# Patient Record
Sex: FEMALE | Race: WHITE | NOT HISPANIC OR LATINO | Employment: OTHER | URBAN - METROPOLITAN AREA
[De-identification: names, ages, dates, MRNs, and addresses within clinical notes are randomized per-mention and may not be internally consistent; named-entity substitution may affect disease eponyms.]

---

## 2018-02-16 ENCOUNTER — HOSPITAL ENCOUNTER (EMERGENCY)
Facility: HOSPITAL | Age: 63
Discharge: HOME | End: 2018-02-16
Attending: EMERGENCY MEDICINE
Payer: MEDICAID

## 2018-02-16 VITALS
SYSTOLIC BLOOD PRESSURE: 177 MMHG | OXYGEN SATURATION: 95 % | RESPIRATION RATE: 20 BRPM | HEART RATE: 92 BPM | BODY MASS INDEX: 24.11 KG/M2 | TEMPERATURE: 99 F | WEIGHT: 150 LBS | DIASTOLIC BLOOD PRESSURE: 79 MMHG | HEIGHT: 66 IN

## 2018-02-16 DIAGNOSIS — K04.7 DENTAL ABSCESS: Primary | ICD-10-CM

## 2018-02-16 PROCEDURE — 41800 DRAINAGE OF GUM LESION: CPT

## 2018-02-16 PROCEDURE — 25000003 PHARM REV CODE 250: Performed by: PHYSICIAN ASSISTANT

## 2018-02-16 PROCEDURE — 99283 EMERGENCY DEPT VISIT LOW MDM: CPT | Mod: 25

## 2018-02-16 RX ORDER — LIDOCAINE HYDROCHLORIDE AND EPINEPHRINE 10; 10 MG/ML; UG/ML
10 INJECTION, SOLUTION INFILTRATION; PERINEURAL ONCE
Status: COMPLETED | OUTPATIENT
Start: 2018-02-16 | End: 2018-02-16

## 2018-02-16 RX ORDER — AMOXICILLIN 875 MG/1
875 TABLET, FILM COATED ORAL
COMMUNITY
End: 2020-10-29

## 2018-02-16 RX ORDER — CLINDAMYCIN HYDROCHLORIDE 150 MG/1
450 CAPSULE ORAL 3 TIMES DAILY
Qty: 63 CAPSULE | Refills: 0 | Status: SHIPPED | OUTPATIENT
Start: 2018-02-16 | End: 2018-02-23

## 2018-02-16 RX ORDER — TRAMADOL HYDROCHLORIDE 50 MG/1
50 TABLET ORAL EVERY 6 HOURS PRN
Qty: 5 TABLET | Refills: 0 | Status: SHIPPED | OUTPATIENT
Start: 2018-02-16

## 2018-02-16 RX ORDER — BUPIVACAINE HYDROCHLORIDE AND EPINEPHRINE 5; 5 MG/ML; UG/ML
3.6 INJECTION, SOLUTION EPIDURAL; INTRACAUDAL; PERINEURAL
Status: DISCONTINUED | OUTPATIENT
Start: 2018-02-16 | End: 2018-02-16 | Stop reason: RX

## 2018-02-16 RX ADMIN — LIDOCAINE HYDROCHLORIDE AND EPINEPHRINE 10 ML: 10; 10 INJECTION, SOLUTION INFILTRATION; PERINEURAL at 12:02

## 2018-02-16 NOTE — ED PROVIDER NOTES
Encounter Date: 2/16/2018    SCRIBE #1 NOTE: I, Pardeep Fowler, am scribing for, and in the presence of,  Quiana Barnes PA-C. I have scribed the following portions of the note - Other sections scribed: HPI and ROS.       History     Chief Complaint   Patient presents with    Dental Pain     Reports being treated for a sinus infection. Has been taking amoxicillin and ibuprofen. States ibuprofen is not working and now her gums are blood red and hurting     CC: Dental Pain     HPI: This 62 y.o F with COPD presents to the ED c/o acute onset of constant and worsening severe (10/10) R side dental pain with associated mild R side facial swelling and gum swelling since 2/11/18. The pt also reports R side jaw pain, right otalgia, right side sinus pressure and intermittent clear rhinorrhea. The pt also notes the right side of her face is warm to the tough. The pt also reports chills and diaphoresis at night x2 days. The pt notes she is only able to consume broth due to the pain. The pt was dx with a sinus infection at urgent care 2/12/18 and Rx amoxacillin and ibuprofen.       The history is provided by the patient. No  was used.     Review of patient's allergies indicates:   Allergen Reactions    Fish containing products     Tetracyclines      Past Medical History:   Diagnosis Date    COPD (chronic obstructive pulmonary disease)     H/O tubal ligation      Past Surgical History:   Procedure Laterality Date    ANKLE SURGERY      CHOLECYSTECTOMY      TUBAL LIGATION       No family history on file.  Social History   Substance Use Topics    Smoking status: Current Every Day Smoker     Packs/day: 0.50     Types: Cigarettes    Smokeless tobacco: Not on file    Alcohol use No     Review of Systems   Constitutional: Positive for chills and diaphoresis (at night). Negative for fatigue and fever.   HENT: Positive for dental problem (R side), ear pain (right ear), facial swelling (R side), rhinorrhea  "and sinus pressure (R side). Negative for sore throat.         (+) R side gum swelling  (-) dental drainage  (+) R side jaw pain   Eyes: Positive for visual disturbance ("cloudy"). Negative for redness.   Respiratory: Negative for cough and shortness of breath.    Cardiovascular: Negative for chest pain.   Gastrointestinal: Negative for abdominal pain, diarrhea, nausea and vomiting.   Genitourinary: Negative for dysuria, frequency and urgency.   Musculoskeletal: Negative for back pain and neck pain.   Skin: Negative for rash.   Neurological: Negative for syncope, weakness and headaches.   Psychiatric/Behavioral: Negative for confusion. The patient is not nervous/anxious.        Physical Exam     Initial Vitals [02/16/18 0941]   BP Pulse Resp Temp SpO2   (!) 146/67 100 20 97.5 °F (36.4 °C) 98 %      MAP       93.33         Physical Exam    Nursing note and vitals reviewed.  Constitutional: Vital signs are normal. She appears well-developed and well-nourished. She is not diaphoretic. She is cooperative.  Non-toxic appearance. She does not have a sickly appearance. She does not appear ill. No distress.   HENT:   Head: Normocephalic and atraumatic.   Right Ear: Tympanic membrane, external ear and ear canal normal.   Left Ear: Tympanic membrane, external ear and ear canal normal.   Nose: Nose normal.   Mouth/Throat: Uvula is midline and oropharynx is clear and moist. No trismus in the jaw. No uvula swelling. No oropharyngeal exudate, posterior oropharyngeal edema or posterior oropharyngeal erythema.       Eyes: Conjunctivae, EOM and lids are normal. Pupils are equal, round, and reactive to light.   Neck: Trachea normal, normal range of motion, full passive range of motion without pain and phonation normal. Neck supple.   Cardiovascular: Normal rate, regular rhythm, normal heart sounds and intact distal pulses. Exam reveals no gallop and no friction rub.    No murmur heard.  Pulmonary/Chest: Effort normal and breath " sounds normal. No respiratory distress. She has no decreased breath sounds. She has no wheezes. She has no rhonchi. She has no rales.   Abdominal: Soft. Normal appearance and bowel sounds are normal. She exhibits no distension and no mass. There is no tenderness. There is no rigidity, no rebound and no guarding.   Musculoskeletal: Normal range of motion.   Neurological: She is alert and oriented to person, place, and time. She has normal strength. No cranial nerve deficit or sensory deficit. GCS eye subscore is 4. GCS verbal subscore is 5. GCS motor subscore is 6.   Skin: Skin is warm and dry. Capillary refill takes less than 2 seconds. No rash noted.   Psychiatric: She has a normal mood and affect. Her speech is normal and behavior is normal. Judgment and thought content normal. Cognition and memory are normal.         ED Course   I & D - Incision and Drainage  Date/Time: 2/16/2018 2:49 PM  Performed by: OTILIA ARELLANO  Authorized by: SONG THOMAS   Consent Done: Yes  Consent: Verbal consent obtained.  Consent given by: patient  Patient understanding: patient states understanding of the procedure being performed  Patient consent: the patient's understanding of the procedure matches consent given  Patient identity confirmed: verbally with patient  Type: abscess  Body area: mouth  Location details: alveolar process  Anesthesia: local infiltration    Anesthesia:  Local Anesthetic: bupivacaine 0.5% without epinephrine and lidocaine 1% with epinephrine (mixed, 5 mL of each )  Anesthetic total: 3 mL  Scalpel size: 11  Incision type: single straight  Complexity: simple  Drainage: pus and  bloody  Drainage amount: moderate  Wound treatment: incision  Patient tolerance: Patient tolerated the procedure well with no immediate complications        Labs Reviewed - No data to display          Medical Decision Making:   Initial Assessment:   This is an evaluation of a 62 y.o. female that presents to the Emergency  Department for dental pain.  Patient reports right lower dental pain with associated red gum and swelling that began on Sunday and has progressively worsened.  She describes the pain as a pulsating 8 out of 10 pain she reports that she was recently treated with amoxicillin and ibuprofen for a sinus infection. She also admits to right sided ear pain and jaw pain associated with her dental pain. She denies any improvement in her dental swelling while taking this medication. She denies any vision changes, sinus pressure or pain, rhinorrhea, trouble swallowing, sore throat, chest pain, shortness of breath or any further symptoms.     Physical Exam shows a non-toxic, afebrile, and well appearing female.  Normocephalic and atraumatic.  PERRLA.  EOMI without pain.  There is no periocular or periorbital swelling. Nares patent, no polyps or rhinorrhea. No sinus TTP. There is an area of induration and fluctuance at the base of tooth #5 of the gingivae with tenderness to palpation and localized swelling that extends to the right cheek. No drainage. The tooth is broken and there is evidence of decay. Poor dentition and with multiple missing and decaying teeth. No further oral lesions noted. Uvula is midline. Posterior Oropharyngeal cavity clear.  No cervical adenopathy.  No nuchal rigidity.  Lungs are clear to auscultation bilaterally, no wheezing, rales or rhonchi.  Regular rate and rhythm, no murmurs, gallops or rubs.  The remainder of the physical exam is unremarkable.    Vital Signs Are Reassuring. If available, previous records reviewed.     My overall impression is Dental abscess. I considered, but at this time, do not suspect Cristiano's angina, facial abscess, sinusitus.     ED Course: I&D of ascess (see procedure note); patient tolerated procedure well with no immediate complications. D/C Meds: Clindamycin, Ultram. Additional D/C Information: Instructed patient to continue her amoxicillin.  Educated patient to follow-up  with her dentist as soon as possible.  Strict ED return precautions discussed. The diagnosis, treatment plan, instructions for follow-up and reevaluation with Dentist, as well as ED return precautions were discussed and understanding was verbalized. All questions or concerns have been addressed. Patient was discharged home with an instructional sheet which gave not only information regarding the most likely diagnoses but also information regarding when to return to the emergency department for alarming symptoms and when to seek further care.      This case was discussed with and the patient has been examined by Dr. Read who is in agreement with my assessment and plan.     Quiana Barnes PA-C              Scribe Attestation:   Scribe #1: I performed the above scribed service and the documentation accurately describes the services I performed. I attest to the accuracy of the note.    Attending Attestation:     Physician Attestation Statement for NP/PA:   I have conducted a face to face encounter with this patient in addition to the NP/PA, due to NP/PA Request    Other NP/PA Attestation Additions:     Physical Exam: Right lower jaw with fluctuant mass. Will need drainage.         Physician Attestation for Scribe:  Physician Attestation Statement for Scribe #1: I, Quiana Barnes PA-C, reviewed documentation, as scribed by Pardeep Fowler in my presence, and it is both accurate and complete.                    Clinical Impression:   The encounter diagnosis was Dental abscess.    Disposition:   Disposition: Discharged  Condition: Stable                        Quiana Barnes PA-C  02/16/18 1452

## 2018-02-16 NOTE — ED PROVIDER NOTES
Encounter Date: 2/16/2018       History     Chief Complaint   Patient presents with    Dental Pain     Reports being treated for a sinus infection. Has been taking amoxicillin and ibuprofen. States ibuprofen is not working and now her gums are blood red and hurting     HPI  Review of patient's allergies indicates:   Allergen Reactions    Fish containing products     Tetracyclines      Past Medical History:   Diagnosis Date    COPD (chronic obstructive pulmonary disease)     H/O tubal ligation      Past Surgical History:   Procedure Laterality Date    ANKLE SURGERY      CHOLECYSTECTOMY      TUBAL LIGATION       No family history on file.  Social History   Substance Use Topics    Smoking status: Current Every Day Smoker     Packs/day: 0.50     Types: Cigarettes    Smokeless tobacco: Not on file    Alcohol use No     Review of Systems    Physical Exam     Initial Vitals [02/16/18 0941]   BP Pulse Resp Temp SpO2   (!) 146/67 100 20 97.5 °F (36.4 °C) 98 %      MAP       93.33         Physical Exam    ED Course   Procedures  Labs Reviewed - No data to display                       Attending Attestation:     Physician Attestation Statement for NP/PA:   I have conducted a face to face encounter with this patient in addition to the NP/PA, due to    Other NP/PA Attestation Additions:      Medical Decision Making: I have reviewed the documentation and discussed the case with the midlevel. I helped formulate the plan.                       Clinical Impression:   The encounter diagnosis was Dental abscess.                           Rosendo Read MD  02/16/18 1519       Rosendo Read MD  02/16/18 1519       Rsoendo Read MD  02/16/18 9988

## 2018-02-16 NOTE — DISCHARGE INSTRUCTIONS
Please follow up with a dentist as soon as possible for reevaluation of symptoms, if you do not have one you can follow up with one of the dental clinics from the list that will given to you with your discharge instructions.    Please take all your antibiotics as prescribed even if your are feeling better or your symptoms resolve.    Please return to the ER for any worsening symptoms including: fever, facial swelling/redness, difficulty opening your mouth/breathing/swallowing or new symptoms or other concerns.

## 2018-02-16 NOTE — ED TRIAGE NOTES
Started amoxicillin on Monday for dental infection. Outer swelling has gone down, internal swelling almost covering tooth. Affecting jaw and ear.

## 2020-10-29 ENCOUNTER — HOSPITAL ENCOUNTER (EMERGENCY)
Facility: HOSPITAL | Age: 65
Discharge: HOME | End: 2020-10-29
Attending: EMERGENCY MEDICINE
Payer: MEDICAID

## 2020-10-29 VITALS
HEART RATE: 78 BPM | OXYGEN SATURATION: 98 % | HEIGHT: 66 IN | WEIGHT: 140 LBS | SYSTOLIC BLOOD PRESSURE: 108 MMHG | DIASTOLIC BLOOD PRESSURE: 55 MMHG | RESPIRATION RATE: 18 BRPM | TEMPERATURE: 98 F | BODY MASS INDEX: 22.5 KG/M2

## 2020-10-29 DIAGNOSIS — K52.9 ENTERITIS: Primary | ICD-10-CM

## 2020-10-29 DIAGNOSIS — R10.84 GENERALIZED ABDOMINAL PAIN: ICD-10-CM

## 2020-10-29 DIAGNOSIS — R07.9 CHEST PAIN: ICD-10-CM

## 2020-10-29 LAB
ALBUMIN SERPL BCP-MCNC: 4.1 G/DL (ref 3.5–5.2)
ALP SERPL-CCNC: 54 U/L (ref 55–135)
ALT SERPL W/O P-5'-P-CCNC: 28 U/L (ref 10–44)
ANION GAP SERPL CALC-SCNC: 12 MMOL/L (ref 8–16)
AST SERPL-CCNC: 29 U/L (ref 10–40)
BACTERIA #/AREA URNS HPF: ABNORMAL /HPF
BASOPHILS # BLD AUTO: 0.05 K/UL (ref 0–0.2)
BASOPHILS NFR BLD: 0.3 % (ref 0–1.9)
BILIRUB SERPL-MCNC: 0.5 MG/DL (ref 0.1–1)
BILIRUB UR QL STRIP: NEGATIVE
BUN SERPL-MCNC: 12 MG/DL (ref 8–23)
CALCIUM SERPL-MCNC: 9.1 MG/DL (ref 8.7–10.5)
CHLORIDE SERPL-SCNC: 99 MMOL/L (ref 95–110)
CLARITY UR: ABNORMAL
CO2 SERPL-SCNC: 26 MMOL/L (ref 23–29)
COLOR UR: YELLOW
CREAT SERPL-MCNC: 1.2 MG/DL (ref 0.5–1.4)
DIFFERENTIAL METHOD: ABNORMAL
EOSINOPHIL # BLD AUTO: 0.1 K/UL (ref 0–0.5)
EOSINOPHIL NFR BLD: 0.9 % (ref 0–8)
ERYTHROCYTE [DISTWIDTH] IN BLOOD BY AUTOMATED COUNT: 13.8 % (ref 11.5–14.5)
EST. GFR  (AFRICAN AMERICAN): 55 ML/MIN/1.73 M^2
EST. GFR  (NON AFRICAN AMERICAN): 48 ML/MIN/1.73 M^2
GLUCOSE SERPL-MCNC: 146 MG/DL (ref 70–110)
GLUCOSE UR QL STRIP: NEGATIVE
HCT VFR BLD AUTO: 42.9 % (ref 37–48.5)
HGB BLD-MCNC: 15 G/DL (ref 12–16)
HGB UR QL STRIP: ABNORMAL
HYALINE CASTS #/AREA URNS LPF: 0 /LPF
IMM GRANULOCYTES # BLD AUTO: 0.07 K/UL (ref 0–0.04)
IMM GRANULOCYTES NFR BLD AUTO: 0.5 % (ref 0–0.5)
KETONES UR QL STRIP: ABNORMAL
LEUKOCYTE ESTERASE UR QL STRIP: ABNORMAL
LIPASE SERPL-CCNC: 22 U/L (ref 4–60)
LYMPHOCYTES # BLD AUTO: 3 K/UL (ref 1–4.8)
LYMPHOCYTES NFR BLD: 20.1 % (ref 18–48)
MCH RBC QN AUTO: 31.4 PG (ref 27–31)
MCHC RBC AUTO-ENTMCNC: 35 G/DL (ref 32–36)
MCV RBC AUTO: 90 FL (ref 82–98)
MICROSCOPIC COMMENT: ABNORMAL
MONOCYTES # BLD AUTO: 1 K/UL (ref 0.3–1)
MONOCYTES NFR BLD: 6.4 % (ref 4–15)
NEUTROPHILS # BLD AUTO: 10.8 K/UL (ref 1.8–7.7)
NEUTROPHILS NFR BLD: 71.8 % (ref 38–73)
NITRITE UR QL STRIP: NEGATIVE
NRBC BLD-RTO: 0 /100 WBC
PH UR STRIP: 6 [PH] (ref 5–8)
PLATELET # BLD AUTO: 524 K/UL (ref 150–350)
PMV BLD AUTO: 9.5 FL (ref 9.2–12.9)
POTASSIUM SERPL-SCNC: 3.4 MMOL/L (ref 3.5–5.1)
PROT SERPL-MCNC: 8 G/DL (ref 6–8.4)
PROT UR QL STRIP: NEGATIVE
RBC # BLD AUTO: 4.78 M/UL (ref 4–5.4)
RBC #/AREA URNS HPF: 5 /HPF (ref 0–4)
SODIUM SERPL-SCNC: 137 MMOL/L (ref 136–145)
SP GR UR STRIP: >1.03 (ref 1–1.03)
SQUAMOUS #/AREA URNS HPF: 10 /HPF
TROPONIN I SERPL DL<=0.01 NG/ML-MCNC: <0.006 NG/ML (ref 0–0.03)
URN SPEC COLLECT METH UR: ABNORMAL
UROBILINOGEN UR STRIP-ACNC: NEGATIVE EU/DL
WBC # BLD AUTO: 15.01 K/UL (ref 3.9–12.7)
WBC #/AREA URNS HPF: 40 /HPF (ref 0–5)
YEAST URNS QL MICRO: ABNORMAL

## 2020-10-29 PROCEDURE — 87086 URINE CULTURE/COLONY COUNT: CPT

## 2020-10-29 PROCEDURE — 93010 ELECTROCARDIOGRAM REPORT: CPT | Mod: ,,, | Performed by: INTERNAL MEDICINE

## 2020-10-29 PROCEDURE — 80053 COMPREHEN METABOLIC PANEL: CPT

## 2020-10-29 PROCEDURE — 81000 URINALYSIS NONAUTO W/SCOPE: CPT

## 2020-10-29 PROCEDURE — 96365 THER/PROPH/DIAG IV INF INIT: CPT | Mod: 59

## 2020-10-29 PROCEDURE — 25500020 PHARM REV CODE 255: Performed by: EMERGENCY MEDICINE

## 2020-10-29 PROCEDURE — 99285 EMERGENCY DEPT VISIT HI MDM: CPT | Mod: 25

## 2020-10-29 PROCEDURE — 63600175 PHARM REV CODE 636 W HCPCS: Performed by: PHYSICIAN ASSISTANT

## 2020-10-29 PROCEDURE — 96375 TX/PRO/DX INJ NEW DRUG ADDON: CPT

## 2020-10-29 PROCEDURE — 93005 ELECTROCARDIOGRAM TRACING: CPT

## 2020-10-29 PROCEDURE — 96361 HYDRATE IV INFUSION ADD-ON: CPT

## 2020-10-29 PROCEDURE — 25000003 PHARM REV CODE 250: Performed by: PHYSICIAN ASSISTANT

## 2020-10-29 PROCEDURE — 83690 ASSAY OF LIPASE: CPT

## 2020-10-29 PROCEDURE — 85025 COMPLETE CBC W/AUTO DIFF WBC: CPT

## 2020-10-29 PROCEDURE — 93010 EKG 12-LEAD: ICD-10-PCS | Mod: ,,, | Performed by: INTERNAL MEDICINE

## 2020-10-29 PROCEDURE — 84484 ASSAY OF TROPONIN QUANT: CPT

## 2020-10-29 RX ORDER — POLYETHYLENE GLYCOL 3350 17 G/17G
17 POWDER, FOR SOLUTION ORAL DAILY
Refills: 0 | COMMUNITY
Start: 2020-10-29

## 2020-10-29 RX ORDER — MORPHINE SULFATE 4 MG/ML
4 INJECTION, SOLUTION INTRAMUSCULAR; INTRAVENOUS
Status: COMPLETED | OUTPATIENT
Start: 2020-10-29 | End: 2020-10-29

## 2020-10-29 RX ORDER — PANTOPRAZOLE SODIUM 40 MG/1
40 TABLET, DELAYED RELEASE ORAL DAILY
Qty: 30 TABLET | Refills: 0 | Status: SHIPPED | OUTPATIENT
Start: 2020-10-29 | End: 2021-10-29

## 2020-10-29 RX ORDER — CEPHALEXIN 500 MG/1
500 CAPSULE ORAL EVERY 12 HOURS
Qty: 20 CAPSULE | Refills: 0 | Status: SHIPPED | OUTPATIENT
Start: 2020-10-29 | End: 2020-11-08

## 2020-10-29 RX ORDER — DEXTROMETHORPHAN HYDROBROMIDE, GUAIFENESIN 5; 100 MG/5ML; MG/5ML
650 LIQUID ORAL EVERY 8 HOURS PRN
Qty: 30 TABLET | Refills: 0 | OUTPATIENT
Start: 2020-10-29 | End: 2022-08-09

## 2020-10-29 RX ORDER — ONDANSETRON 2 MG/ML
4 INJECTION INTRAMUSCULAR; INTRAVENOUS
Status: COMPLETED | OUTPATIENT
Start: 2020-10-29 | End: 2020-10-29

## 2020-10-29 RX ORDER — ONDANSETRON 4 MG/1
4 TABLET, ORALLY DISINTEGRATING ORAL EVERY 12 HOURS PRN
Qty: 10 TABLET | Refills: 0 | Status: SHIPPED | OUTPATIENT
Start: 2020-10-29

## 2020-10-29 RX ADMIN — MORPHINE SULFATE 4 MG: 4 INJECTION INTRAVENOUS at 06:10

## 2020-10-29 RX ADMIN — SODIUM CHLORIDE 1000 ML: 0.9 INJECTION, SOLUTION INTRAVENOUS at 06:10

## 2020-10-29 RX ADMIN — PROMETHAZINE HYDROCHLORIDE 25 MG: 25 INJECTION INTRAMUSCULAR; INTRAVENOUS at 08:10

## 2020-10-29 RX ADMIN — IOHEXOL 75 ML: 350 INJECTION, SOLUTION INTRAVENOUS at 06:10

## 2020-10-29 RX ADMIN — ONDANSETRON 4 MG: 2 INJECTION INTRAMUSCULAR; INTRAVENOUS at 06:10

## 2020-10-29 NOTE — ED PROVIDER NOTES
"Encounter Date: 10/29/2020    SCRIBE #1 NOTE: I, Yohana Jovany, am scribing for, and in the presence of,  Adeel Rose PA-C. I have scribed the following portions of the note - Other sections scribed: HPI, ROS.       History     Chief Complaint   Patient presents with    Abdominal Pain     C/o abdominal pain and nausea x 2 hours, hx pancreatitis, also c/o sob      CC: Abdominal Pain    HPI:  This is a 64 y.o. female with a history of pancreatitis who presents to the Ed with a chief complaint of generalized abdominal pain that began approximately five hours ago (1 pm). The patient states that the pain began in the epigastric region and she "thought it was heartburn." She states that the pain then spread to the rest of her abdomen. She reports associated symptoms of nausea and vomiting. Denies hematemesis or fever. No modifying factors were noted. She states that the pain feels similar to her previous occurrence of pancreatitis. She has a past surgical history of a cholecystectomy. Denies recent alcohol intake.    The history is provided by the patient.     Review of patient's allergies indicates:   Allergen Reactions    Fish containing products     Tetracyclines      Past Medical History:   Diagnosis Date    COPD (chronic obstructive pulmonary disease)     H/O tubal ligation      Past Surgical History:   Procedure Laterality Date    ANKLE SURGERY      CHOLECYSTECTOMY      TUBAL LIGATION       History reviewed. No pertinent family history.  Social History     Tobacco Use    Smoking status: Current Every Day Smoker     Packs/day: 0.50     Types: Cigarettes   Substance Use Topics    Alcohol use: No    Drug use: No     Review of Systems   Constitutional: Negative for activity change, appetite change, chills, diaphoresis and fever.   HENT: Negative for congestion, drooling, ear pain, mouth sores, rhinorrhea, sinus pain, sore throat and trouble swallowing.    Eyes: Negative for pain and discharge.   Respiratory: " Negative for cough, chest tightness, shortness of breath, wheezing and stridor.    Cardiovascular: Negative for chest pain, palpitations and leg swelling.   Gastrointestinal: Positive for abdominal pain (generalized), nausea and vomiting. Negative for abdominal distention, blood in stool, constipation and diarrhea.   Genitourinary: Negative for difficulty urinating, dysuria, flank pain, frequency, hematuria and urgency.   Musculoskeletal: Negative for arthralgias, back pain and myalgias.   Skin: Negative for pallor, rash and wound.   Neurological: Negative for dizziness, syncope, weakness, light-headedness and numbness.       Physical Exam     Initial Vitals [10/29/20 1532]   BP Pulse Resp Temp SpO2   (!) 139/55 70 18 97.5 °F (36.4 °C) 98 %      MAP       --         Physical Exam    ED Course   Procedures  Labs Reviewed   CBC W/ AUTO DIFFERENTIAL - Abnormal; Notable for the following components:       Result Value    WBC 15.01 (*)     MCH 31.4 (*)     Platelets 524 (*)     Gran # (ANC) 10.8 (*)     Immature Grans (Abs) 0.07 (*)     All other components within normal limits   COMPREHENSIVE METABOLIC PANEL - Abnormal; Notable for the following components:    Potassium 3.4 (*)     Glucose 146 (*)     Alkaline Phosphatase 54 (*)     eGFR if  55 (*)     eGFR if non  48 (*)     All other components within normal limits   URINALYSIS, REFLEX TO URINE CULTURE - Abnormal; Notable for the following components:    Appearance, UA Cloudy (*)     Specific Gravity, UA >1.030 (*)     Ketones, UA Trace (*)     Occult Blood UA 1+ (*)     Leukocytes, UA 3+ (*)     All other components within normal limits    Narrative:     Specimen Source->Urine   URINALYSIS MICROSCOPIC - Abnormal; Notable for the following components:    RBC, UA 5 (*)     WBC, UA 40 (*)     Bacteria Moderate (*)     All other components within normal limits    Narrative:     Specimen Source->Urine   CULTURE, URINE   LIPASE   TROPONIN I           Imaging Results          CT Abdomen Pelvis With Contrast (Final result)  Result time 10/29/20 19:23:26    Final result by Eileen Stanton MD (10/29/20 19:23:26)                 Impression:      Fluid-filled loops of prominent small bowel.  Some small bowel wall thickening.  Mesenteric edema and small free pelvic fluid.  Findings are concerning for enteritis.  Another consideration may include a developing small bowel obstruction.    Calcifications involving both adrenal glands.  Question remote hemorrhages.    Colonic diverticulosis without definite evidence of diverticulitis.      Electronically signed by: Eileen Stanton  Date:    10/29/2020  Time:    19:23             Narrative:    EXAMINATION:  CT OF ABDOMEN PELVIS WITH    CLINICAL HISTORY:  History of pancreatitis complaining of abdominal pain and nausea x2 hours.  Also complaining of shortness of breath.    TECHNIQUE:  5 mm enhanced axial images were obtained from the lung bases through the greater trochanters.  Seventy-five mL of Omnipaque 350 was injected.    COMPARISON:  06/18/2015    FINDINGS:  There is some fluid filled nondilated but prominent loops of small bowel.  Mesenteric edema and small free pelvic fluid are noted.  A few of the small bowel loops contain a demonstrate a thickening.  There is even focal narrowing of a short segment of the small bowel in the central abdomen (series 2 axial image 75 coronal image 33 and sagittal image 82).    The liver, spleen, pancreas, and kidneys are unremarkable. The gallbladder is surgically absent.  There are calcifications involving bilateral adrenal glands.    There is no definite evidence for abdominal adenopathy or ascites.  Advanced vascular calcifications are present.    The appendix is not inflamed.  There is colonic diverticulosis.  Moderate fecal material is present.  There is a lobular peripherally calcified 13 x 10 mm lesion in the left anterior pelvis, which previously was an area of fat  necrosis (series 2 axial image 103).    Minimal left basilar atelectasis is present.                               X-Ray Chest AP Portable (Final result)  Result time 10/29/20 17:30:54    Final result by Zafar Vasquez MD (10/29/20 17:30:54)                 Impression:      1. No acute cardiopulmonary process noting pulmonary hyperexpansion may reflect that of COPD/emphysema, correlation is advised.      Electronically signed by: Zafar Vasquez MD  Date:    10/29/2020  Time:    17:30             Narrative:    EXAMINATION:  XR CHEST AP PORTABLE    CLINICAL HISTORY:  Chest pain, unspecified    TECHNIQUE:  Single frontal view of the chest was performed.    COMPARISON:  06/10/2015    FINDINGS:  The cardiomediastinal silhouette is not enlarged noting calcification of the aorta..  There is no pleural effusion.  The trachea is midline.  The lungs are symmetrically expanded bilaterally without evidence of acute parenchymal process.  There are scattered calcified granulomas.  No large focal consolidation seen.  There is no pneumothorax.  The osseous structures are remarkable for degenerative change..                                 Medical Decision Making:   ED Management:  63 y/o female with abdominal pain, N/V today. Last BM yesterday. No fever. Abdomen with mild generalized tenderness. CT with evidence for enteritis, not able to r/o early obstruction. Pt passing gas today. No evidence of appendicitis, diverticulitis, or abscess. Labs notable for leukocytosis. Symptoms much improved after meds given and she is tolerating po. Lower suspicion for SBO, but pt given strict return precautions. Discharged with PCP and GI f/u instructions.   Adeel SCOTT                After shift change, I was informed by Patricio Fish RN that lab called her stating there was an error with the UA results initially uploaded. Pt UA with 10 squams, moderate bacteria, 40 WBCs and 5 RBCs. Due to abdominal pain with nausea and vomting will  cover with keflex for possible UTI. Discussed with pt.   NATALI Kenney PA-C 10/29/2020 2131             Scribe Attestation:   Scribe #1: I performed the above scribed service and the documentation accurately describes the services I performed. I attest to the accuracy of the note.                      Clinical Impression:       ICD-10-CM ICD-9-CM   1. Enteritis  K52.9 558.9   2. Chest pain  R07.9 786.50   3. Generalized abdominal pain  R10.84 789.07                          ED Disposition Condition    Discharge Stable        ED Prescriptions     Medication Sig Dispense Start Date End Date Auth. Provider    ondansetron (ZOFRAN-ODT) 4 MG TbDL Take 1 tablet (4 mg total) by mouth every 12 (twelve) hours as needed (vomiting). 10 tablet 10/29/2020  Adeel Rose PA-C    pantoprazole (PROTONIX) 40 MG tablet Take 1 tablet (40 mg total) by mouth once daily. 30 tablet 10/29/2020 10/29/2021 Adeel Rose PA-C    acetaminophen (TYLENOL) 650 MG TbSR Take 1 tablet (650 mg total) by mouth every 8 (eight) hours as needed. 30 tablet 10/29/2020  Adeel Rose PA-C    polyethylene glycol (GLYCOLAX) 17 gram/dose powder Take 17 g by mouth once daily.  10/29/2020  Adeel Rose PA-C    cephALEXin (KEFLEX) 500 MG capsule Take 1 capsule (500 mg total) by mouth every 12 (twelve) hours. for 10 days 20 capsule 10/29/2020 11/8/2020 Angi Kenney PA-C        Follow-up Information     Follow up With Specialties Details Why Contact Info Additional Information    Primary care provider  Schedule an appointment as soon as possible for a visit        [facility] [address] [facility] Schedule an appointment as soon as possible for a visit  For follow-up care [id number]  [facility] [address] [phone] [facility] - Main Building, 4th Floor Please park in [facility] and take [facility] elevator    [facility] Go to  If symptoms worsen [address]  [place] [id number] [phone]                       I, Adeel Rose, personally performed the services described in this documentation. All medical record entries made by the scribe were at my direction and in my presence. I have reviewed the chart and agree that the record reflects my personal performance and is accurate and complete.                 Adeel Rose, PAGemmaC  10/29/20 3106

## 2020-10-29 NOTE — FIRST PROVIDER EVALUATION
Emergency Department TeleTriage Encounter Note      CHIEF COMPLAINT    Chief Complaint   Patient presents with    Abdominal Pain     C/o abdominal pain and nausea x 2 hours, hx pancreatitis, also c/o sob        VITAL SIGNS   Initial Vitals [10/29/20 1532]   BP Pulse Resp Temp SpO2   (!) 139/55 70 18 97.5 °F (36.4 °C) 98 %      MAP       --            ALLERGIES    Review of patient's allergies indicates:   Allergen Reactions    Fish containing products     Tetracyclines        PROVIDER TRIAGE NOTE  This is a teletriage evaluation of a 64 y.o. female presenting to the ED with c/o chest pain that radiates down to her abdomen. N/V. Hx of pancreatitis. Feels similar. Initial orders will be placed and care will be transferred to an alternate provider when patient is roomed for a full evaluation. Any additional orders and the final disposition will be determined by that provider.         ORDERS  Labs Reviewed   CBC W/ AUTO DIFFERENTIAL   COMPREHENSIVE METABOLIC PANEL   LIPASE   URINALYSIS, REFLEX TO URINE CULTURE   TROPONIN I       ED Orders (720h ago, onward)    Start Ordered     Status Ordering Provider    10/29/20 1647 10/29/20 1646  EKG 12-lead  Once      Ordered WILLY JURADO    10/29/20 1647 10/29/20 1646  Troponin I  STAT  Collect    Ordered WILLY JURADO    10/29/20 1647 10/29/20 1646  X-Ray Chest AP Portable  1 time imaging      Ordered WILLY JURADO    10/29/20 1602 10/29/20 1601  Vital signs  Every 2 hours      Ordered JERI GRIGGS    10/29/20 1602 10/29/20 1601  Diet NPO  Diet effective now      Ordered JERI GRIGGS    10/29/20 1602 10/29/20 1601  Insert peripheral IV  Once      Ordered JERI GRIGGS    10/29/20 1602 10/29/20 1601  CBC W/ AUTO DIFFERENTIAL  STAT  Collect    Ordered JERI GRIGGS    10/29/20 1602 10/29/20 1601  Comp. Metabolic Panel  STAT  Collect    Ordered JERI GRIGGS    10/29/20 1602 10/29/20 1601  Lipase  STAT  Collect    Ordered JERI GRIGGS    10/29/20 1602 10/29/20  1602  Urinalysis, Reflex to Urine Culture Urine, Clean Catch  STAT      Ordered JERI GRIGGS            Virtual Visit Note: The provider triage portion of this emergency department evaluation and documentation was performed via [employer], a HIPAA-compliant telemedicine application, in concert with a tele-presenter in the room. A face to face patient evaluation with one of my colleagues will occur once the patient is placed in an emergency department room.      DISCLAIMER: This note was prepared with [employer] voice recognition transcription software. Garbled syntax, mangled pronouns, and other bizarre constructions may be attributed to that software system.

## 2020-10-29 NOTE — ED TRIAGE NOTES
abd pain states has pancreatitis.  + nausea, diarrhea and vomiting.  Denies fever.   Generalized pain from lower chest to lower abd.

## 2020-10-30 NOTE — ED NOTES
Kimmie Chavez with called stating that resulted UA results weren't for the pt and were uploaded in error. Kimmie states that correct results will be uploaded within 10 mins.

## 2020-10-31 LAB — BACTERIA UR CULT: NORMAL

## 2020-11-05 VITALS
OXYGEN SATURATION: 94 % | DIASTOLIC BLOOD PRESSURE: 86 MMHG | BODY MASS INDEX: 22.5 KG/M2 | HEART RATE: 109 BPM | SYSTOLIC BLOOD PRESSURE: 147 MMHG | HEIGHT: 66 IN | WEIGHT: 140 LBS | RESPIRATION RATE: 24 BRPM | TEMPERATURE: 99 F

## 2020-11-05 LAB
ALBUMIN SERPL BCP-MCNC: 3.8 G/DL (ref 3.5–5.2)
ALP SERPL-CCNC: 50 U/L (ref 55–135)
ALT SERPL W/O P-5'-P-CCNC: 17 U/L (ref 10–44)
ANION GAP SERPL CALC-SCNC: 14 MMOL/L (ref 8–16)
AST SERPL-CCNC: 20 U/L (ref 10–40)
BASOPHILS # BLD AUTO: 0.03 K/UL (ref 0–0.2)
BASOPHILS NFR BLD: 0.3 % (ref 0–1.9)
BILIRUB SERPL-MCNC: 0.5 MG/DL (ref 0.1–1)
BNP SERPL-MCNC: 19 PG/ML (ref 0–99)
BUN SERPL-MCNC: 14 MG/DL (ref 8–23)
CALCIUM SERPL-MCNC: 9.9 MG/DL (ref 8.7–10.5)
CHLORIDE SERPL-SCNC: 101 MMOL/L (ref 95–110)
CO2 SERPL-SCNC: 22 MMOL/L (ref 23–29)
CREAT SERPL-MCNC: 0.9 MG/DL (ref 0.5–1.4)
CTP QC/QA: YES
DIFFERENTIAL METHOD: ABNORMAL
EOSINOPHIL # BLD AUTO: 0.1 K/UL (ref 0–0.5)
EOSINOPHIL NFR BLD: 0.6 % (ref 0–8)
ERYTHROCYTE [DISTWIDTH] IN BLOOD BY AUTOMATED COUNT: 13.4 % (ref 11.5–14.5)
EST. GFR  (AFRICAN AMERICAN): >60 ML/MIN/1.73 M^2
EST. GFR  (NON AFRICAN AMERICAN): >60 ML/MIN/1.73 M^2
GLUCOSE SERPL-MCNC: 118 MG/DL (ref 70–110)
HCT VFR BLD AUTO: 41.1 % (ref 37–48.5)
HGB BLD-MCNC: 14.2 G/DL (ref 12–16)
IMM GRANULOCYTES # BLD AUTO: 0.06 K/UL (ref 0–0.04)
IMM GRANULOCYTES NFR BLD AUTO: 0.6 % (ref 0–0.5)
LYMPHOCYTES # BLD AUTO: 2.5 K/UL (ref 1–4.8)
LYMPHOCYTES NFR BLD: 24.7 % (ref 18–48)
MAGNESIUM SERPL-MCNC: 1.7 MG/DL (ref 1.6–2.6)
MCH RBC QN AUTO: 31.2 PG (ref 27–31)
MCHC RBC AUTO-ENTMCNC: 34.5 G/DL (ref 32–36)
MCV RBC AUTO: 90 FL (ref 82–98)
MONOCYTES # BLD AUTO: 1.4 K/UL (ref 0.3–1)
MONOCYTES NFR BLD: 14.1 % (ref 4–15)
NEUTROPHILS # BLD AUTO: 6 K/UL (ref 1.8–7.7)
NEUTROPHILS NFR BLD: 59.7 % (ref 38–73)
NRBC BLD-RTO: 0 /100 WBC
PLATELET # BLD AUTO: 478 K/UL (ref 150–350)
PMV BLD AUTO: 9.8 FL (ref 9.2–12.9)
POTASSIUM SERPL-SCNC: 3.7 MMOL/L (ref 3.5–5.1)
PROT SERPL-MCNC: 7.4 G/DL (ref 6–8.4)
RBC # BLD AUTO: 4.55 M/UL (ref 4–5.4)
SARS-COV-2 RDRP RESP QL NAA+PROBE: NEGATIVE
SODIUM SERPL-SCNC: 137 MMOL/L (ref 136–145)
TROPONIN I SERPL DL<=0.01 NG/ML-MCNC: 0.01 NG/ML (ref 0–0.03)
WBC # BLD AUTO: 9.99 K/UL (ref 3.9–12.7)

## 2020-11-05 PROCEDURE — U0002 COVID-19 LAB TEST NON-CDC: HCPCS | Performed by: PHYSICIAN ASSISTANT

## 2020-11-05 PROCEDURE — 83735 ASSAY OF MAGNESIUM: CPT

## 2020-11-05 PROCEDURE — 99285 EMERGENCY DEPT VISIT HI MDM: CPT | Mod: 25

## 2020-11-05 PROCEDURE — 83880 ASSAY OF NATRIURETIC PEPTIDE: CPT

## 2020-11-05 PROCEDURE — 93010 ELECTROCARDIOGRAM REPORT: CPT | Mod: ,,, | Performed by: INTERNAL MEDICINE

## 2020-11-05 PROCEDURE — 80053 COMPREHEN METABOLIC PANEL: CPT

## 2020-11-05 PROCEDURE — 93010 EKG 12-LEAD: ICD-10-PCS | Mod: ,,, | Performed by: INTERNAL MEDICINE

## 2020-11-05 PROCEDURE — 85025 COMPLETE CBC W/AUTO DIFF WBC: CPT

## 2020-11-05 PROCEDURE — 93005 ELECTROCARDIOGRAM TRACING: CPT

## 2020-11-05 PROCEDURE — 84484 ASSAY OF TROPONIN QUANT: CPT

## 2020-11-05 NOTE — FIRST PROVIDER EVALUATION
Emergency Department TeleTriage Encounter Note      CHIEF COMPLAINT    Chief Complaint   Patient presents with    Shortness of Breath     Pt c/o SOB x1 week. Reports hx of COPD. States she was seen in ED last week for same thing. Also c/o abdominal pain, reports she was told she has a blockage. Pain is 10/10    Abdominal Pain       VITAL SIGNS   Initial Vitals [11/05/20 1728]   BP Pulse Resp Temp SpO2   (!) 147/86 109 (!) 24 99.2 °F (37.3 °C) (!) 94 %      MAP       --            ALLERGIES    Review of patient's allergies indicates:   Allergen Reactions    Fish containing products     Tetracyclines        PROVIDER TRIAGE NOTE    64-year-old female presents to the ED for evaluation of shortness of breath worsening over the last week.  History of COPD.  No fevers or chills.  Has had a minimal cough.  Also complains of abdominal discomfort.  No vomiting or diarrhea      Initial orders will be placed and care will be transferred to an alternate provider when patient is roomed for a full evaluation. Any additional orders and the final disposition will be determined by that provider.      ORDERS  Labs Reviewed   CBC W/ AUTO DIFFERENTIAL   COMPREHENSIVE METABOLIC PANEL   MAGNESIUM   TROPONIN I   B-TYPE NATRIURETIC PEPTIDE   SARS-COV-2 RDRP GENE       ED Orders (720h ago, onward)    Start Ordered     Status Ordering Provider    11/05/20 1737 11/05/20 1736  Magnesium  STAT  Collect    Ordered JOSE NORMAN    11/05/20 1737 11/05/20 1736  Troponin I  STAT  Collect    Ordered JOSE NORMAN    11/05/20 1737 11/05/20 1736  Insert Saline lock IV  Once      Ordered JOSE NORMAN    11/05/20 1737 11/05/20 1736  Cardiac Monitoring - Adult  Continuous     Comments: Notify Physician If:    Ordered JOSE NORMAN    11/05/20 1737 11/05/20 1736  Pulse Oximetry Continuous  Continuous      Ordered JOSE NORMAN    11/05/20 1737 11/05/20 1736  EKG 12-lead  Once      Ordered JOSE NORMAN    11/05/20 1737  11/05/20 1736  Brain natriuretic peptide  STAT  Collect    Ordered EMERSON, JOSE    11/05/20 1737 11/05/20 1736  X-Ray Chest AP Portable  1 time imaging      Ordered EMERSON, JOSE    11/05/20 1737 11/05/20 1736  POCT COVID-19 Rapid Screening  Once      Ordered EMERSON, JOSE    11/05/20 1737 11/05/20 1736  Airborne and Contact and Droplet Isolation Status  Continuous      Ordered NAYELIUTTY, JOSE    11/05/20 1736 11/05/20 1736  CBC auto differential  STAT  Collect    Ordered EMERSON, JOSE    11/05/20 1736 11/05/20 1736  Comprehensive metabolic panel  STAT  Collect    Ordered EMERSON, JOSE            Virtual Visit Note: The provider triage portion of this emergency department evaluation and documentation was performed via [employer], a HIPAA-compliant telemedicine application, in concert with a tele-presenter in the room. A face to face patient evaluation with one of my colleagues will occur once the patient is placed in an emergency department room.      DISCLAIMER: This note was prepared with [employer] voice recognition transcription software. Garbled syntax, mangled pronouns, and other bizarre constructions may be attributed to that software system.

## 2020-11-06 ENCOUNTER — HOSPITAL ENCOUNTER (EMERGENCY)
Facility: HOSPITAL | Age: 65
Discharge: LEFT BEFORE DISCHARGE | End: 2020-11-06
Payer: MEDICAID

## 2020-11-06 DIAGNOSIS — R06.02 SHORTNESS OF BREATH: ICD-10-CM

## 2022-02-08 DIAGNOSIS — Z12.31 ENCOUNTER FOR SCREENING MAMMOGRAM FOR MALIGNANT NEOPLASM OF BREAST: Primary | ICD-10-CM

## 2022-02-08 DIAGNOSIS — M81.0 AGE-RELATED OSTEOPOROSIS WITHOUT CURRENT PATHOLOGICAL FRACTURE: Primary | ICD-10-CM

## 2022-08-09 ENCOUNTER — HOSPITAL ENCOUNTER (EMERGENCY)
Facility: HOSPITAL | Age: 67
Discharge: HOME | End: 2022-08-09
Attending: EMERGENCY MEDICINE
Payer: COMMERCIAL

## 2022-08-09 VITALS
SYSTOLIC BLOOD PRESSURE: 133 MMHG | HEART RATE: 89 BPM | BODY MASS INDEX: 24.91 KG/M2 | OXYGEN SATURATION: 98 % | WEIGHT: 155 LBS | RESPIRATION RATE: 20 BRPM | DIASTOLIC BLOOD PRESSURE: 68 MMHG | HEIGHT: 66 IN | TEMPERATURE: 98 F

## 2022-08-09 DIAGNOSIS — K08.89 PAIN, DENTAL: Primary | ICD-10-CM

## 2022-08-09 PROCEDURE — 25000003 PHARM REV CODE 250: Performed by: PHYSICIAN ASSISTANT

## 2022-08-09 PROCEDURE — 99284 EMERGENCY DEPT VISIT MOD MDM: CPT

## 2022-08-09 RX ORDER — ACETAMINOPHEN 500 MG
500 TABLET ORAL EVERY 4 HOURS PRN
Qty: 20 TABLET | Refills: 0 | Status: SHIPPED | OUTPATIENT
Start: 2022-08-09 | End: 2022-08-14

## 2022-08-09 RX ORDER — HYDROXYZINE PAMOATE 50 MG/1
50 CAPSULE ORAL 4 TIMES DAILY PRN
Qty: 30 CAPSULE | Refills: 0 | Status: SHIPPED | OUTPATIENT
Start: 2022-08-09

## 2022-08-09 RX ORDER — HYDROXYZINE PAMOATE 25 MG/1
50 CAPSULE ORAL
Status: COMPLETED | OUTPATIENT
Start: 2022-08-09 | End: 2022-08-09

## 2022-08-09 RX ORDER — OXYCODONE HYDROCHLORIDE 5 MG/1
5 TABLET ORAL EVERY 4 HOURS PRN
Qty: 15 TABLET | Refills: 0 | Status: SHIPPED | OUTPATIENT
Start: 2022-08-09 | End: 2022-08-12

## 2022-08-09 RX ORDER — OXYCODONE AND ACETAMINOPHEN 5; 325 MG/1; MG/1
1 TABLET ORAL
Status: COMPLETED | OUTPATIENT
Start: 2022-08-09 | End: 2022-08-09

## 2022-08-09 RX ADMIN — OXYCODONE AND ACETAMINOPHEN 1 TABLET: 5; 325 TABLET ORAL at 10:08

## 2022-08-09 RX ADMIN — HYDROXYZINE PAMOATE 50 MG: 25 CAPSULE ORAL at 10:08

## 2022-08-09 NOTE — ED PROVIDER NOTES
Encounter Date: 8/9/2022    SCRIBE #1 NOTE: I, Prosper Kelly, am scribing for, and in the presence of,  Angi Kenney PA-C. I have scribed the following portions of the note - Other sections scribed: HPI, ROS.       History     Chief Complaint   Patient presents with    Dental Pain     Pt reports left sided dental pain, reports she has a cracked tooth. Reports previous cracked tooth on left side. Reports her PCP prescribed amoxicillin and ibuprofen. Pt reports throbbing pain that started last night and numbness and swelling to cheek. Denies weakness and blurry vision.      CC: Dental pain    HPI: This is a 66 y.o. F who has COPD who presents to the ED for emergent evaluation of acute lower dental pain that began 3 days ago. She describes the dental pain as a throbbing pain. Pt has associated tingling to the left side of the face that began upon waking at 3:00am today. She states that she was unable to go back to sleep due to the pain, and tingling. She reports that she wakes up every 2 hours and has not been able to get a good nights rest since the onset of the dental pain. Pt states that she would like to sleep. She has only eaten 2 cups of mac and cheese, and 1 cup of ice cream within the last 3 days due to the dental pain. She attributes her symptoms to a cracked tooth. Pt states that she wears dentures, but she has not been wearing her dentures due to dentures applying pressure to the cracked tooth and causing pain. She called [facility] yesterday, and was prescribed Amoxicillin and Ibuprofen yesterday. She took 2 tablets of Amoxicillin today, and 1 tablet of Amoxicillin at 3:00am today. She states that she is going to call and schedule a dental appointment after today's ED visit. Pt denies SOB, CP, SI, or HI.    The history is provided by the patient. No  was used.     Review of patient's allergies indicates:   Allergen Reactions    Fish containing products     Tetracyclines      Past  Medical History:   Diagnosis Date    COPD (chronic obstructive pulmonary disease)     H/O tubal ligation      Past Surgical History:   Procedure Laterality Date    ANKLE SURGERY      CHOLECYSTECTOMY      TUBAL LIGATION       History reviewed. No pertinent family history.  Social History     Tobacco Use    Smoking status: Current Every Day Smoker     Packs/day: 0.50     Types: Cigarettes    Smokeless tobacco: Never Used   Substance Use Topics    Alcohol use: No    Drug use: No     Review of Systems   Constitutional: Negative for chills, diaphoresis and fever.   HENT: Positive for dental problem (lower dental pain).    Eyes: Negative for photophobia and visual disturbance.   Respiratory: Negative for cough and shortness of breath.    Cardiovascular: Negative for chest pain and leg swelling.   Gastrointestinal: Negative for abdominal pain, blood in stool, constipation, diarrhea, nausea and vomiting.   Genitourinary: Negative for dysuria, flank pain, frequency, hematuria and urgency.   Musculoskeletal: Negative for neck pain and neck stiffness.   Skin: Negative for rash and wound.   Neurological: Negative for weakness, light-headedness, numbness and headaches.        (+) Tingling to the left side of the face   Psychiatric/Behavioral: Positive for sleep disturbance (due to the dental pain). Negative for confusion and suicidal ideas.        (-) Homicidal ideations       Physical Exam     Initial Vitals [08/09/22 0847]   BP Pulse Resp Temp SpO2   124/63 88 15 98 °F (36.7 °C) 97 %      MAP       --         Physical Exam    Nursing note and vitals reviewed.  Constitutional: She appears well-developed and well-nourished.   HENT:   Head: Normocephalic.   Right Ear: External ear normal.   Left Ear: External ear normal.   Nose: Nose normal.   Mouth/Throat: Uvula is midline, oropharynx is clear and moist and mucous membranes are normal. No oropharyngeal exudate, posterior oropharyngeal edema or posterior oropharyngeal  erythema.       No protrusion of tongue or ttp of mouth floor   Tolerating secretions      Eyes: Conjunctivae are normal.   Cardiovascular: Normal rate and regular rhythm. Exam reveals no gallop and no friction rub.    No murmur heard.  Pulmonary/Chest: Breath sounds normal. No respiratory distress. She has no wheezes. She has no rhonchi. She has no rales.   Abdominal: Abdomen is soft. Bowel sounds are normal. She exhibits no distension. There is no abdominal tenderness. There is no rebound, no guarding, no tenderness at McBurney's point and negative Love's sign.   Musculoskeletal:         General: Normal range of motion.     Neurological: She is alert. She has normal strength. No cranial nerve deficit or sensory deficit.   Skin: Skin is warm and dry.   Psychiatric: Her mood appears anxious. Her speech is rapid and/or pressured.         ED Course   Procedures  Labs Reviewed - No data to display       Imaging Results    None          Medications   hydrOXYzine pamoate capsule 50 mg (50 mg Oral Given 8/9/22 1037)   oxyCODONE-acetaminophen 5-325 mg per tablet 1 tablet (1 tablet Oral Given 8/9/22 1037)     Medical Decision Making:   ED Management:  66-year-old female presenting for evaluation of dental pain.  Exam above.  No evidence drainable abscess, airway compromise, Cristiano's angina or deep neck space infection.  Will treat patient with pain control as well as visatril for insomnia.  She is currently on ibuprofen and Amoxil.  She states she will contact her dentist today to schedule follow up appointment. Return to ER for worsening symptoms or as needed          Scribe Attestation:   Scribe #1: I performed the above scribed service and the documentation accurately describes the services I performed. I attest to the accuracy of the note.               I, Angi Kenney PA-C , personally performed the services described in this documentation. All medical record entries made by the scribe were at my direction and in  my presence. I have reviewed the chart and agree that the record reflects my personal performance and is accurate and complete.  Clinical Impression:   Final diagnoses:  [K08.89] Pain, dental (Primary)          ED Disposition Condition    Discharge Stable        ED Prescriptions     Medication Sig Dispense Start Date End Date Auth. Provider    acetaminophen (TYLENOL) 500 MG tablet Take 1 tablet (500 mg total) by mouth every 4 (four) hours as needed. 20 tablet 8/9/2022 8/14/2022 Angi Kenney PA-C    oxyCODONE (ROXICODONE) 5 MG immediate release tablet Take 1 tablet (5 mg total) by mouth every 4 (four) hours as needed for Pain. 15 tablet 8/9/2022 8/12/2022 Angi Kenney PA-C    hydrOXYzine pamoate (VISTARIL) 50 MG Cap Take 1 capsule (50 mg total) by mouth 4 (four) times daily as needed. MAY CAUSE DROWSINESS 30 capsule 8/9/2022  Angi Kenney PA-C        Follow-up Information     Follow up With Specialties Details Why Contact Info    Napoleon Morfin MD Family Medicine Schedule an appointment as soon as possible for a visit in 2 days for follow up [address]  [address] [address] [phone]      [facility] - [place], [place] - Emergency Dept Emergency Medicine Go to  As needed, If symptoms worsen [address]  [facility] [address] [phone]           Angi Kenney PA-C  08/09/22 [id number]

## 2022-08-09 NOTE — DISCHARGE INSTRUCTIONS

## 2022-08-15 ENCOUNTER — HOSPITAL ENCOUNTER (OUTPATIENT)
Dept: RADIOLOGY | Facility: HOSPITAL | Age: 67
Discharge: HOME | End: 2022-08-15
Attending: NURSE PRACTITIONER
Payer: COMMERCIAL

## 2022-08-15 DIAGNOSIS — Z12.31 ENCOUNTER FOR SCREENING MAMMOGRAM FOR MALIGNANT NEOPLASM OF BREAST: ICD-10-CM

## 2022-08-15 PROCEDURE — 77067 SCR MAMMO BI INCL CAD: CPT | Mod: 26,,, | Performed by: RADIOLOGY

## 2022-08-15 PROCEDURE — 77063 BREAST TOMOSYNTHESIS BI: CPT | Mod: 26,,, | Performed by: RADIOLOGY

## 2022-08-15 PROCEDURE — 77063 MAMMO DIGITAL SCREENING BILAT WITH TOMO: ICD-10-PCS | Mod: 26,,, | Performed by: RADIOLOGY

## 2022-08-15 PROCEDURE — 77067 SCR MAMMO BI INCL CAD: CPT | Mod: TC,PO

## 2022-08-15 PROCEDURE — 77067 MAMMO DIGITAL SCREENING BILAT WITH TOMO: ICD-10-PCS | Mod: 26,,, | Performed by: RADIOLOGY

## 2022-09-30 ENCOUNTER — HOSPITAL ENCOUNTER (OUTPATIENT)
Dept: RADIOLOGY | Facility: CLINIC | Age: 67
Discharge: HOME | End: 2022-09-30
Attending: NURSE PRACTITIONER
Payer: COMMERCIAL

## 2022-09-30 DIAGNOSIS — M81.0 AGE-RELATED OSTEOPOROSIS WITHOUT CURRENT PATHOLOGICAL FRACTURE: ICD-10-CM

## 2022-09-30 PROCEDURE — 77080 DXA BONE DENSITY AXIAL: CPT | Mod: 26,,, | Performed by: INTERNAL MEDICINE

## 2022-09-30 PROCEDURE — 77080 DEXA BONE DENSITY SPINE HIP: ICD-10-PCS | Mod: 26,,, | Performed by: INTERNAL MEDICINE

## 2022-09-30 PROCEDURE — 77080 DXA BONE DENSITY AXIAL: CPT | Mod: TC,PO

## 2023-09-13 DIAGNOSIS — Z12.31 ENCOUNTER FOR SCREENING MAMMOGRAM FOR MALIGNANT NEOPLASM OF BREAST: Primary | ICD-10-CM

## 2023-09-13 DIAGNOSIS — R19.00 LUMP IN THE ABDOMEN: ICD-10-CM

## 2023-10-04 ENCOUNTER — HOSPITAL ENCOUNTER (OUTPATIENT)
Dept: RADIOLOGY | Facility: HOSPITAL | Age: 68
Discharge: HOME | End: 2023-10-04
Attending: NURSE PRACTITIONER
Payer: MEDICARE

## 2023-10-04 VITALS — WEIGHT: 155 LBS | BODY MASS INDEX: 24.91 KG/M2 | HEIGHT: 66 IN

## 2023-10-04 DIAGNOSIS — R19.00 LUMP IN THE ABDOMEN: ICD-10-CM

## 2023-10-04 DIAGNOSIS — Z12.31 ENCOUNTER FOR SCREENING MAMMOGRAM FOR MALIGNANT NEOPLASM OF BREAST: ICD-10-CM

## 2023-10-04 PROCEDURE — 77067 SCR MAMMO BI INCL CAD: CPT | Mod: TC

## 2023-10-04 PROCEDURE — 76705 ECHO EXAM OF ABDOMEN: CPT | Mod: 26,,, | Performed by: RADIOLOGY

## 2023-10-04 PROCEDURE — 77067 MAMMO DIGITAL SCREENING BILAT WITH TOMO: ICD-10-PCS | Mod: 26,,, | Performed by: RADIOLOGY

## 2023-10-04 PROCEDURE — 77067 SCR MAMMO BI INCL CAD: CPT | Mod: 26,,, | Performed by: RADIOLOGY

## 2023-10-04 PROCEDURE — 76705 ECHO EXAM OF ABDOMEN: CPT | Mod: TC

## 2023-10-04 PROCEDURE — 77063 MAMMO DIGITAL SCREENING BILAT WITH TOMO: ICD-10-PCS | Mod: 26,,, | Performed by: RADIOLOGY

## 2023-10-04 PROCEDURE — 77063 BREAST TOMOSYNTHESIS BI: CPT | Mod: 26,,, | Performed by: RADIOLOGY

## 2023-10-04 PROCEDURE — 76705 US SOFT TISSUE, ABDOMEN: ICD-10-PCS | Mod: 26,,, | Performed by: RADIOLOGY

## 2023-10-16 DIAGNOSIS — R92.8 ABNORMAL MAMMOGRAM: Primary | ICD-10-CM

## 2023-10-23 ENCOUNTER — HOSPITAL ENCOUNTER (OUTPATIENT)
Dept: RADIOLOGY | Facility: HOSPITAL | Age: 68
Discharge: HOME | End: 2023-10-23
Payer: MEDICARE

## 2023-10-23 DIAGNOSIS — R92.8 ABNORMAL MAMMOGRAM: ICD-10-CM

## 2023-10-23 PROCEDURE — 77061 BREAST TOMOSYNTHESIS UNI: CPT | Mod: TC,RT

## 2023-10-23 PROCEDURE — 77061 MAMMO DIGITAL DIAGNOSTIC RIGHT WITH TOMO: ICD-10-PCS | Mod: 26,RT,, | Performed by: RADIOLOGY

## 2023-10-23 PROCEDURE — 77061 BREAST TOMOSYNTHESIS UNI: CPT | Mod: 26,RT,, | Performed by: RADIOLOGY

## 2023-10-23 PROCEDURE — 76642 ULTRASOUND BREAST LIMITED: CPT | Mod: 26,RT,, | Performed by: RADIOLOGY

## 2023-10-23 PROCEDURE — 76642 ULTRASOUND BREAST LIMITED: CPT | Mod: TC,RT

## 2023-10-23 PROCEDURE — 77065 DX MAMMO INCL CAD UNI: CPT | Mod: 26,RT,, | Performed by: RADIOLOGY

## 2023-10-23 PROCEDURE — 77065 MAMMO DIGITAL DIAGNOSTIC RIGHT WITH TOMO: ICD-10-PCS | Mod: 26,RT,, | Performed by: RADIOLOGY

## 2023-10-23 PROCEDURE — 76642 US BREAST RIGHT LIMITED: ICD-10-PCS | Mod: 26,RT,, | Performed by: RADIOLOGY

## 2024-10-16 DIAGNOSIS — Z12.31 VISIT FOR SCREENING MAMMOGRAM: Primary | ICD-10-CM

## 2024-10-16 DIAGNOSIS — Z78.0 ASYMPTOMATIC MENOPAUSAL STATE: Primary | ICD-10-CM

## 2024-11-04 ENCOUNTER — HOSPITAL ENCOUNTER (OUTPATIENT)
Dept: RADIOLOGY | Facility: HOSPITAL | Age: 69
Discharge: HOME | End: 2024-11-04
Attending: NURSE PRACTITIONER
Payer: MEDICARE

## 2024-11-04 ENCOUNTER — HOSPITAL ENCOUNTER (OUTPATIENT)
Dept: RADIOLOGY | Facility: CLINIC | Age: 69
Discharge: HOME | End: 2024-11-04
Attending: NURSE PRACTITIONER
Payer: MEDICARE

## 2024-11-04 DIAGNOSIS — Z12.31 VISIT FOR SCREENING MAMMOGRAM: ICD-10-CM

## 2024-11-04 DIAGNOSIS — Z78.0 ASYMPTOMATIC MENOPAUSAL STATE: ICD-10-CM

## 2024-11-04 PROCEDURE — 77063 BREAST TOMOSYNTHESIS BI: CPT | Mod: 26,,, | Performed by: RADIOLOGY

## 2024-11-04 PROCEDURE — 77080 DXA BONE DENSITY AXIAL: CPT | Mod: 26,,, | Performed by: INTERNAL MEDICINE

## 2024-11-04 PROCEDURE — 77080 DXA BONE DENSITY AXIAL: CPT | Mod: TC,PO

## 2024-11-04 PROCEDURE — 77067 SCR MAMMO BI INCL CAD: CPT | Mod: 26,,, | Performed by: RADIOLOGY

## 2024-11-04 PROCEDURE — 77067 SCR MAMMO BI INCL CAD: CPT | Mod: TC,PO

## 2025-02-09 ENCOUNTER — HOSPITAL ENCOUNTER (OUTPATIENT)
Facility: HOSPITAL | Age: 70
Discharge: HOME | End: 2025-02-10
Attending: STUDENT IN AN ORGANIZED HEALTH CARE EDUCATION/TRAINING PROGRAM | Admitting: HOSPITALIST
Payer: MEDICARE

## 2025-02-09 DIAGNOSIS — I21.4 NSTEMI (NON-ST ELEVATION MYOCARDIAL INFARCTION): ICD-10-CM

## 2025-02-09 DIAGNOSIS — I21.4 NON-ST ELEVATION MYOCARDIAL INFARCTION (NSTEMI): ICD-10-CM

## 2025-02-09 DIAGNOSIS — R07.9 CHEST PAIN: Primary | ICD-10-CM

## 2025-02-09 PROBLEM — F32.A DEPRESSION: Status: ACTIVE | Noted: 2022-11-10

## 2025-02-09 PROBLEM — E78.5 HLD (HYPERLIPIDEMIA): Status: ACTIVE | Noted: 2022-11-10

## 2025-02-09 PROBLEM — I10 ESSENTIAL HYPERTENSION: Status: ACTIVE | Noted: 2022-11-10

## 2025-02-09 PROBLEM — Z95.828 H/O AORTO-FEMORAL BYPASS: Status: ACTIVE | Noted: 2023-04-19

## 2025-02-09 PROBLEM — Z99.2 ESRD (END STAGE RENAL DISEASE) ON DIALYSIS: Status: ACTIVE | Noted: 2023-03-29

## 2025-02-09 PROBLEM — F41.9 ANXIETY: Status: ACTIVE | Noted: 2022-11-10

## 2025-02-09 PROBLEM — J44.9 COPD (CHRONIC OBSTRUCTIVE PULMONARY DISEASE): Status: ACTIVE | Noted: 2022-11-10

## 2025-02-09 PROBLEM — I73.9 PAD (PERIPHERAL ARTERY DISEASE): Status: ACTIVE | Noted: 2022-11-10

## 2025-02-09 PROBLEM — N18.6 ESRD (END STAGE RENAL DISEASE) ON DIALYSIS: Status: ACTIVE | Noted: 2023-03-29

## 2025-02-09 LAB
ABO + RH BLD: NORMAL
ALBUMIN SERPL BCP-MCNC: 3.3 G/DL (ref 3.5–5.2)
ALP SERPL-CCNC: 52 U/L (ref 40–150)
ALT SERPL W/O P-5'-P-CCNC: 33 U/L (ref 10–44)
ANION GAP SERPL CALC-SCNC: 11 MMOL/L (ref 8–16)
APTT PPP: 25.1 SEC (ref 21–32)
AST SERPL-CCNC: 33 U/L (ref 10–40)
BASOPHILS # BLD AUTO: 0.04 K/UL (ref 0–0.2)
BASOPHILS NFR BLD: 0.4 % (ref 0–1.9)
BILIRUB SERPL-MCNC: 0.5 MG/DL (ref 0.1–1)
BLD GP AB SCN CELLS X3 SERPL QL: NORMAL
BNP SERPL-MCNC: 135 PG/ML (ref 0–99)
BUN SERPL-MCNC: 12 MG/DL (ref 8–23)
CALCIUM SERPL-MCNC: 8.4 MG/DL (ref 8.7–10.5)
CHLORIDE SERPL-SCNC: 101 MMOL/L (ref 95–110)
CHOLEST SERPL-MCNC: 132 MG/DL (ref 120–199)
CHOLEST/HDLC SERPL: 2.5 {RATIO} (ref 2–5)
CO2 SERPL-SCNC: 22 MMOL/L (ref 23–29)
CREAT SERPL-MCNC: 1 MG/DL (ref 0.5–1.4)
DIFFERENTIAL METHOD BLD: ABNORMAL
EOSINOPHIL # BLD AUTO: 0.1 K/UL (ref 0–0.5)
EOSINOPHIL NFR BLD: 1 % (ref 0–8)
ERYTHROCYTE [DISTWIDTH] IN BLOOD BY AUTOMATED COUNT: 13.4 % (ref 11.5–14.5)
EST. GFR  (NO RACE VARIABLE): >60 ML/MIN/1.73 M^2
GLUCOSE SERPL-MCNC: 111 MG/DL (ref 70–110)
HCT VFR BLD AUTO: 49.8 % (ref 37–48.5)
HDLC SERPL-MCNC: 52 MG/DL (ref 40–75)
HDLC SERPL: 39.4 % (ref 20–50)
HGB BLD-MCNC: 17.4 G/DL (ref 12–16)
IMM GRANULOCYTES # BLD AUTO: 0.05 K/UL (ref 0–0.04)
IMM GRANULOCYTES NFR BLD AUTO: 0.4 % (ref 0–0.5)
INR PPP: 1 (ref 0.8–1.2)
LDLC SERPL CALC-MCNC: 62.2 MG/DL (ref 63–159)
LIPASE SERPL-CCNC: 76 U/L (ref 4–60)
LYMPHOCYTES # BLD AUTO: 3.3 K/UL (ref 1–4.8)
LYMPHOCYTES NFR BLD: 29.2 % (ref 18–48)
MAGNESIUM SERPL-MCNC: 1.7 MG/DL (ref 1.6–2.6)
MCH RBC QN AUTO: 32 PG (ref 27–31)
MCHC RBC AUTO-ENTMCNC: 34.9 G/DL (ref 32–36)
MCV RBC AUTO: 92 FL (ref 82–98)
MONOCYTES # BLD AUTO: 0.8 K/UL (ref 0.3–1)
MONOCYTES NFR BLD: 6.9 % (ref 4–15)
NEUTROPHILS # BLD AUTO: 7 K/UL (ref 1.8–7.7)
NEUTROPHILS NFR BLD: 62.1 % (ref 38–73)
NONHDLC SERPL-MCNC: 80 MG/DL
NRBC BLD-RTO: 0 /100 WBC
PLATELET # BLD AUTO: 216 K/UL (ref 150–450)
PMV BLD AUTO: 9.3 FL (ref 9.2–12.9)
POTASSIUM SERPL-SCNC: 4.2 MMOL/L (ref 3.5–5.1)
PROT SERPL-MCNC: 6.8 G/DL (ref 6–8.4)
PROTHROMBIN TIME: 11.6 SEC (ref 9–12.5)
RBC # BLD AUTO: 5.44 M/UL (ref 4–5.4)
SODIUM SERPL-SCNC: 134 MMOL/L (ref 136–145)
SPECIMEN OUTDATE: NORMAL
TRIGL SERPL-MCNC: 89 MG/DL (ref 30–150)
TROPONIN I SERPL DL<=0.01 NG/ML-MCNC: <0.006 NG/ML (ref 0–0.03)
TSH SERPL DL<=0.005 MIU/L-ACNC: 1.99 UIU/ML (ref 0.4–4)
WBC # BLD AUTO: 11.29 K/UL (ref 3.9–12.7)

## 2025-02-09 PROCEDURE — 93010 ELECTROCARDIOGRAM REPORT: CPT | Mod: ,,, | Performed by: INTERNAL MEDICINE

## 2025-02-09 PROCEDURE — 83880 ASSAY OF NATRIURETIC PEPTIDE: CPT

## 2025-02-09 PROCEDURE — 96365 THER/PROPH/DIAG IV INF INIT: CPT

## 2025-02-09 PROCEDURE — 85610 PROTHROMBIN TIME: CPT

## 2025-02-09 PROCEDURE — 84484 ASSAY OF TROPONIN QUANT: CPT | Mod: 91

## 2025-02-09 PROCEDURE — 63600175 PHARM REV CODE 636 W HCPCS

## 2025-02-09 PROCEDURE — 63600175 PHARM REV CODE 636 W HCPCS: Mod: JZ,TB | Performed by: STUDENT IN AN ORGANIZED HEALTH CARE EDUCATION/TRAINING PROGRAM

## 2025-02-09 PROCEDURE — 80053 COMPREHEN METABOLIC PANEL: CPT

## 2025-02-09 PROCEDURE — 80061 LIPID PANEL: CPT

## 2025-02-09 PROCEDURE — 25000003 PHARM REV CODE 250

## 2025-02-09 PROCEDURE — 83735 ASSAY OF MAGNESIUM: CPT

## 2025-02-09 PROCEDURE — 84443 ASSAY THYROID STIM HORMONE: CPT

## 2025-02-09 PROCEDURE — 25000242 PHARM REV CODE 250 ALT 637 W/ HCPCS

## 2025-02-09 PROCEDURE — 85730 THROMBOPLASTIN TIME PARTIAL: CPT

## 2025-02-09 PROCEDURE — 96375 TX/PRO/DX INJ NEW DRUG ADDON: CPT

## 2025-02-09 PROCEDURE — G0378 HOSPITAL OBSERVATION PER HR: HCPCS

## 2025-02-09 PROCEDURE — 96366 THER/PROPH/DIAG IV INF ADDON: CPT

## 2025-02-09 PROCEDURE — 83690 ASSAY OF LIPASE: CPT

## 2025-02-09 PROCEDURE — 85025 COMPLETE CBC W/AUTO DIFF WBC: CPT

## 2025-02-09 PROCEDURE — 83036 HEMOGLOBIN GLYCOSYLATED A1C: CPT

## 2025-02-09 PROCEDURE — 25000003 PHARM REV CODE 250: Performed by: STUDENT IN AN ORGANIZED HEALTH CARE EDUCATION/TRAINING PROGRAM

## 2025-02-09 PROCEDURE — 96361 HYDRATE IV INFUSION ADD-ON: CPT

## 2025-02-09 PROCEDURE — 86900 BLOOD TYPING SEROLOGIC ABO: CPT

## 2025-02-09 PROCEDURE — 99285 EMERGENCY DEPT VISIT HI MDM: CPT | Mod: 25

## 2025-02-09 PROCEDURE — 93005 ELECTROCARDIOGRAM TRACING: CPT

## 2025-02-09 RX ORDER — NITROGLYCERIN 0.4 MG/1
0.4 TABLET SUBLINGUAL EVERY 5 MIN PRN
Status: DISCONTINUED | OUTPATIENT
Start: 2025-02-09 | End: 2025-02-10 | Stop reason: HOSPADM

## 2025-02-09 RX ORDER — IPRATROPIUM BROMIDE AND ALBUTEROL 20; 100 UG/1; UG/1
1 SPRAY, METERED RESPIRATORY (INHALATION) 2 TIMES DAILY
COMMUNITY
Start: 2025-01-16

## 2025-02-09 RX ORDER — CLOPIDOGREL BISULFATE 300 MG/1
600 TABLET, FILM COATED ORAL ONCE
Status: COMPLETED | OUTPATIENT
Start: 2025-02-09 | End: 2025-02-09

## 2025-02-09 RX ORDER — CALCIUM GLUCONATE 20 MG/ML
1 INJECTION, SOLUTION INTRAVENOUS
Status: COMPLETED | OUTPATIENT
Start: 2025-02-09 | End: 2025-02-09

## 2025-02-09 RX ORDER — ONDANSETRON HYDROCHLORIDE 2 MG/ML
4 INJECTION, SOLUTION INTRAVENOUS ONCE AS NEEDED
Status: DISCONTINUED | OUTPATIENT
Start: 2025-02-09 | End: 2025-02-10 | Stop reason: HOSPADM

## 2025-02-09 RX ORDER — ALUMINUM HYDROXIDE, MAGNESIUM HYDROXIDE, AND SIMETHICONE 1200; 120; 1200 MG/30ML; MG/30ML; MG/30ML
30 SUSPENSION ORAL
Status: COMPLETED | OUTPATIENT
Start: 2025-02-09 | End: 2025-02-09

## 2025-02-09 RX ORDER — AMLODIPINE BESYLATE 5 MG/1
5 TABLET ORAL DAILY
Status: DISCONTINUED | OUTPATIENT
Start: 2025-02-10 | End: 2025-02-10 | Stop reason: HOSPADM

## 2025-02-09 RX ORDER — ACETAMINOPHEN 325 MG/1
650 TABLET ORAL EVERY 6 HOURS PRN
Status: DISCONTINUED | OUTPATIENT
Start: 2025-02-09 | End: 2025-02-10 | Stop reason: HOSPADM

## 2025-02-09 RX ORDER — METOPROLOL TARTRATE 25 MG/1
25 TABLET, FILM COATED ORAL 2 TIMES DAILY
Status: DISCONTINUED | OUTPATIENT
Start: 2025-02-09 | End: 2025-02-09

## 2025-02-09 RX ORDER — TRAMADOL HYDROCHLORIDE 50 MG/1
50 TABLET ORAL EVERY 6 HOURS PRN
Status: DISCONTINUED | OUTPATIENT
Start: 2025-02-09 | End: 2025-02-10 | Stop reason: HOSPADM

## 2025-02-09 RX ORDER — METOPROLOL SUCCINATE 200 MG/1
200 TABLET, EXTENDED RELEASE ORAL NIGHTLY
COMMUNITY
End: 2025-02-10

## 2025-02-09 RX ORDER — TALC
6 POWDER (GRAM) TOPICAL NIGHTLY PRN
Status: DISCONTINUED | OUTPATIENT
Start: 2025-02-09 | End: 2025-02-10 | Stop reason: HOSPADM

## 2025-02-09 RX ORDER — KETOROLAC TROMETHAMINE 30 MG/ML
15 INJECTION, SOLUTION INTRAMUSCULAR; INTRAVENOUS
Status: COMPLETED | OUTPATIENT
Start: 2025-02-09 | End: 2025-02-09

## 2025-02-09 RX ORDER — POLYETHYLENE GLYCOL 3350 17 G/17G
17 POWDER, FOR SOLUTION ORAL DAILY
Status: DISCONTINUED | OUTPATIENT
Start: 2025-02-10 | End: 2025-02-10

## 2025-02-09 RX ORDER — BENZONATATE 100 MG/1
100 CAPSULE ORAL 3 TIMES DAILY PRN
Status: DISCONTINUED | OUTPATIENT
Start: 2025-02-09 | End: 2025-02-10 | Stop reason: HOSPADM

## 2025-02-09 RX ORDER — PANTOPRAZOLE SODIUM 40 MG/1
40 TABLET, DELAYED RELEASE ORAL DAILY
Status: DISCONTINUED | OUTPATIENT
Start: 2025-02-10 | End: 2025-02-10 | Stop reason: HOSPADM

## 2025-02-09 RX ORDER — NITROGLYCERIN 0.4 MG/1
0.4 TABLET SUBLINGUAL EVERY 5 MIN PRN
Status: DISCONTINUED | OUTPATIENT
Start: 2025-02-09 | End: 2025-02-09

## 2025-02-09 RX ORDER — MONTELUKAST SODIUM 10 MG/1
10 TABLET ORAL DAILY
COMMUNITY

## 2025-02-09 RX ORDER — FUROSEMIDE 20 MG/1
20 TABLET ORAL DAILY
COMMUNITY
Start: 2025-02-04

## 2025-02-09 RX ORDER — MIRTAZAPINE 15 MG/1
15 TABLET, FILM COATED ORAL NIGHTLY
COMMUNITY

## 2025-02-09 RX ORDER — HYDROXYZINE HYDROCHLORIDE 25 MG/1
25 TABLET, FILM COATED ORAL 3 TIMES DAILY PRN
COMMUNITY
Start: 2025-01-02

## 2025-02-09 RX ORDER — BUSPIRONE HYDROCHLORIDE 7.5 MG/1
7.5 TABLET ORAL
COMMUNITY
Start: 2024-12-06

## 2025-02-09 RX ORDER — GABAPENTIN 300 MG/1
300 CAPSULE ORAL 2 TIMES DAILY PRN
COMMUNITY

## 2025-02-09 RX ORDER — CLOPIDOGREL BISULFATE 75 MG/1
75 TABLET ORAL DAILY
Status: DISCONTINUED | OUTPATIENT
Start: 2025-02-10 | End: 2025-02-10 | Stop reason: HOSPADM

## 2025-02-09 RX ORDER — MORPHINE SULFATE 4 MG/ML
2 INJECTION, SOLUTION INTRAMUSCULAR; INTRAVENOUS
Status: ACTIVE | OUTPATIENT
Start: 2025-02-09 | End: 2025-02-10

## 2025-02-09 RX ORDER — ASPIRIN 81 MG/1
81 TABLET ORAL DAILY
COMMUNITY

## 2025-02-09 RX ORDER — ASPIRIN 325 MG
325 TABLET ORAL
Status: DISPENSED | OUTPATIENT
Start: 2025-02-09 | End: 2025-02-10

## 2025-02-09 RX ORDER — AMLODIPINE BESYLATE 5 MG/1
5 TABLET ORAL DAILY
COMMUNITY
Start: 2025-01-09 | End: 2025-02-10 | Stop reason: HOSPADM

## 2025-02-09 RX ORDER — FUROSEMIDE 20 MG/1
20 TABLET ORAL DAILY
Status: DISCONTINUED | OUTPATIENT
Start: 2025-02-10 | End: 2025-02-10 | Stop reason: HOSPADM

## 2025-02-09 RX ORDER — LOPERAMIDE HYDROCHLORIDE 2 MG/1
4 CAPSULE ORAL ONCE AS NEEDED
Status: DISCONTINUED | OUTPATIENT
Start: 2025-02-09 | End: 2025-02-10 | Stop reason: HOSPADM

## 2025-02-09 RX ORDER — GUAIFENESIN 100 MG/5ML
200 SOLUTION ORAL EVERY 4 HOURS PRN
Status: DISCONTINUED | OUTPATIENT
Start: 2025-02-09 | End: 2025-02-10 | Stop reason: HOSPADM

## 2025-02-09 RX ORDER — HYDROXYZINE HYDROCHLORIDE 25 MG/1
25 TABLET, FILM COATED ORAL 3 TIMES DAILY PRN
Status: DISCONTINUED | OUTPATIENT
Start: 2025-02-09 | End: 2025-02-10 | Stop reason: HOSPADM

## 2025-02-09 RX ORDER — LINACLOTIDE 145 UG/1
145 CAPSULE, GELATIN COATED ORAL DAILY PRN
COMMUNITY
Start: 2025-01-13

## 2025-02-09 RX ORDER — MONTELUKAST SODIUM 10 MG/1
10 TABLET ORAL DAILY
Status: DISCONTINUED | OUTPATIENT
Start: 2025-02-10 | End: 2025-02-10 | Stop reason: HOSPADM

## 2025-02-09 RX ORDER — METOCLOPRAMIDE 5 MG/1
5 TABLET ORAL 3 TIMES DAILY
COMMUNITY
Start: 2024-12-03

## 2025-02-09 RX ORDER — GABAPENTIN 300 MG/1
300 CAPSULE ORAL 2 TIMES DAILY PRN
Status: DISCONTINUED | OUTPATIENT
Start: 2025-02-09 | End: 2025-02-10 | Stop reason: HOSPADM

## 2025-02-09 RX ORDER — FAMOTIDINE 10 MG/ML
20 INJECTION INTRAVENOUS
Status: COMPLETED | OUTPATIENT
Start: 2025-02-09 | End: 2025-02-09

## 2025-02-09 RX ORDER — POLYETHYLENE GLYCOL 3350 17 G/17G
17 POWDER, FOR SOLUTION ORAL ONCE AS NEEDED
Status: DISCONTINUED | OUTPATIENT
Start: 2025-02-09 | End: 2025-02-09

## 2025-02-09 RX ORDER — METOPROLOL TARTRATE 50 MG/1
50 TABLET ORAL 4 TIMES DAILY
Status: DISCONTINUED | OUTPATIENT
Start: 2025-02-09 | End: 2025-02-10

## 2025-02-09 RX ORDER — FLUOXETINE HYDROCHLORIDE 20 MG/1
20 CAPSULE ORAL DAILY
Status: DISCONTINUED | OUTPATIENT
Start: 2025-02-10 | End: 2025-02-10 | Stop reason: HOSPADM

## 2025-02-09 RX ORDER — HYDRALAZINE HYDROCHLORIDE 20 MG/ML
10 INJECTION INTRAMUSCULAR; INTRAVENOUS EVERY 6 HOURS PRN
Status: DISCONTINUED | OUTPATIENT
Start: 2025-02-09 | End: 2025-02-10 | Stop reason: HOSPADM

## 2025-02-09 RX ORDER — NAPROXEN SODIUM 220 MG/1
81 TABLET, FILM COATED ORAL DAILY
Status: DISCONTINUED | OUTPATIENT
Start: 2025-02-10 | End: 2025-02-09

## 2025-02-09 RX ORDER — MIRTAZAPINE 15 MG/1
15 TABLET, FILM COATED ORAL NIGHTLY
Status: DISCONTINUED | OUTPATIENT
Start: 2025-02-09 | End: 2025-02-10 | Stop reason: HOSPADM

## 2025-02-09 RX ORDER — ATORVASTATIN CALCIUM 40 MG/1
80 TABLET, FILM COATED ORAL DAILY
Status: DISCONTINUED | OUTPATIENT
Start: 2025-02-10 | End: 2025-02-10 | Stop reason: HOSPADM

## 2025-02-09 RX ORDER — ROSUVASTATIN CALCIUM 10 MG/1
10 TABLET, COATED ORAL NIGHTLY
COMMUNITY
Start: 2025-01-02

## 2025-02-09 RX ORDER — ASPIRIN 81 MG/1
81 TABLET ORAL DAILY
Status: DISCONTINUED | OUTPATIENT
Start: 2025-02-10 | End: 2025-02-10 | Stop reason: HOSPADM

## 2025-02-09 RX ORDER — SIMETHICONE 80 MG
1 TABLET,CHEWABLE ORAL 3 TIMES DAILY PRN
Status: DISCONTINUED | OUTPATIENT
Start: 2025-02-09 | End: 2025-02-10 | Stop reason: HOSPADM

## 2025-02-09 RX ORDER — ALUMINUM HYDROXIDE, MAGNESIUM HYDROXIDE, AND SIMETHICONE 1200; 120; 1200 MG/30ML; MG/30ML; MG/30ML
30 SUSPENSION ORAL EVERY 6 HOURS PRN
Status: DISCONTINUED | OUTPATIENT
Start: 2025-02-09 | End: 2025-02-10 | Stop reason: HOSPADM

## 2025-02-09 RX ORDER — HEPARIN SODIUM,PORCINE/D5W 25000/250
0-40 INTRAVENOUS SOLUTION INTRAVENOUS CONTINUOUS
Status: DISCONTINUED | OUTPATIENT
Start: 2025-02-09 | End: 2025-02-10

## 2025-02-09 RX ORDER — AMOXICILLIN 250 MG
1 CAPSULE ORAL DAILY PRN
Status: DISCONTINUED | OUTPATIENT
Start: 2025-02-09 | End: 2025-02-10 | Stop reason: HOSPADM

## 2025-02-09 RX ORDER — IPRATROPIUM BROMIDE AND ALBUTEROL SULFATE 2.5; .5 MG/3ML; MG/3ML
3 SOLUTION RESPIRATORY (INHALATION) EVERY 6 HOURS
Status: DISCONTINUED | OUTPATIENT
Start: 2025-02-10 | End: 2025-02-10 | Stop reason: HOSPADM

## 2025-02-09 RX ORDER — FLUOXETINE HYDROCHLORIDE 20 MG/1
20 CAPSULE ORAL DAILY
COMMUNITY
Start: 2025-01-02

## 2025-02-09 RX ORDER — ATORVASTATIN CALCIUM 40 MG/1
40 TABLET, FILM COATED ORAL DAILY
Status: DISCONTINUED | OUTPATIENT
Start: 2025-02-10 | End: 2025-02-09

## 2025-02-09 RX ADMIN — NITROGLYCERIN 0.4 MG: 0.4 TABLET, ORALLY DISINTEGRATING SUBLINGUAL at 07:02

## 2025-02-09 RX ADMIN — SODIUM CHLORIDE 500 ML: 9 INJECTION, SOLUTION INTRAVENOUS at 06:02

## 2025-02-09 RX ADMIN — HEPARIN SODIUM 12 UNITS/KG/HR: 10000 INJECTION, SOLUTION INTRAVENOUS at 09:02

## 2025-02-09 RX ADMIN — Medication 6 MG: at 10:02

## 2025-02-09 RX ADMIN — KETOROLAC TROMETHAMINE 15 MG: 30 INJECTION, SOLUTION INTRAMUSCULAR; INTRAVENOUS at 05:02

## 2025-02-09 RX ADMIN — CALCIUM GLUCONATE 1 G: 20 INJECTION, SOLUTION INTRAVENOUS at 06:02

## 2025-02-09 RX ADMIN — BUSPIRONE HYDROCHLORIDE 7.5 MG: 5 TABLET ORAL at 10:02

## 2025-02-09 RX ADMIN — CLOPIDOGREL BISULFATE 600 MG: 300 TABLET, FILM COATED ORAL at 10:02

## 2025-02-09 RX ADMIN — ALUMINUM HYDROXIDE, MAGNESIUM HYDROXIDE, AND SIMETHICONE 30 ML: 1200; 120; 1200 SUSPENSION ORAL at 06:02

## 2025-02-09 RX ADMIN — FAMOTIDINE 20 MG: 10 INJECTION, SOLUTION INTRAVENOUS at 09:02

## 2025-02-09 NOTE — ED NOTES
Pt w/ Hx of COPD and coronary by-pass 2 yrs ago, presents w/ c/o sudden onset to non-radiating left sided CP and SOB after mopping the floor.  Denies N/V or palpitations.  Denies trauma.  Pt is AAOx3, resp even and unlabored, skin warm and dry.  HOB elevated, SR up x 2, Call bell within reach. NAD noted.

## 2025-02-10 ENCOUNTER — PATIENT OUTREACH (OUTPATIENT)
Dept: ADMINISTRATIVE | Facility: OTHER | Age: 70
End: 2025-02-10
Payer: MEDICARE

## 2025-02-10 VITALS
HEIGHT: 66 IN | RESPIRATION RATE: 23 BRPM | SYSTOLIC BLOOD PRESSURE: 101 MMHG | TEMPERATURE: 98 F | BODY MASS INDEX: 27.8 KG/M2 | WEIGHT: 173 LBS | HEART RATE: 61 BPM | DIASTOLIC BLOOD PRESSURE: 53 MMHG | OXYGEN SATURATION: 91 %

## 2025-02-10 PROBLEM — I20.0 UNSTABLE ANGINA: Status: ACTIVE | Noted: 2025-02-10

## 2025-02-10 LAB
ALBUMIN SERPL BCP-MCNC: 3.2 G/DL (ref 3.5–5.2)
ALP SERPL-CCNC: 45 U/L (ref 40–150)
ALT SERPL W/O P-5'-P-CCNC: 25 U/L (ref 10–44)
ANION GAP SERPL CALC-SCNC: 13 MMOL/L (ref 8–16)
APTT PPP: 40.6 SEC (ref 21–32)
APTT PPP: >150 SEC (ref 21–32)
AST SERPL-CCNC: 26 U/L (ref 10–40)
AV INDEX (PROSTH): 0.93
AV MEAN GRADIENT: 4 MMHG
AV PEAK GRADIENT: 7 MMHG
AV VALVE AREA BY VELOCITY RATIO: 2.9 CM²
AV VALVE AREA: 2.9 CM²
AV VELOCITY RATIO: 0.92
BASOPHILS # BLD AUTO: 0.03 K/UL (ref 0–0.2)
BASOPHILS NFR BLD: 0.3 % (ref 0–1.9)
BILIRUB SERPL-MCNC: 0.4 MG/DL (ref 0.1–1)
BSA FOR ECHO PROCEDURE: 1.91 M2
BUN SERPL-MCNC: 14 MG/DL (ref 8–23)
CALCIUM SERPL-MCNC: 8.5 MG/DL (ref 8.7–10.5)
CHLORIDE SERPL-SCNC: 103 MMOL/L (ref 95–110)
CO2 SERPL-SCNC: 23 MMOL/L (ref 23–29)
CREAT SERPL-MCNC: 1 MG/DL (ref 0.5–1.4)
CV ECHO LV RWT: 0.53 CM
DIFFERENTIAL METHOD BLD: ABNORMAL
DOP CALC AO PEAK VEL: 1.3 M/S
DOP CALC AO VTI: 33.4 CM
DOP CALC LVOT AREA: 3.1 CM2
DOP CALC LVOT DIAMETER: 2 CM
DOP CALC LVOT PEAK VEL: 1.2 M/S
DOP CALC LVOT STROKE VOLUME: 97.3 CM3
DOP CALC MV VTI: 38.5 CM
DOP CALCLVOT PEAK VEL VTI: 31 CM
E WAVE DECELERATION TIME: 329 MSEC
E/A RATIO: 0.8
E/E' RATIO: 13 M/S
ECHO LV POSTERIOR WALL: 1.2 CM (ref 0.6–1.1)
EOSINOPHIL # BLD AUTO: 0.2 K/UL (ref 0–0.5)
EOSINOPHIL NFR BLD: 1.7 % (ref 0–8)
ERYTHROCYTE [DISTWIDTH] IN BLOOD BY AUTOMATED COUNT: 13.6 % (ref 11.5–14.5)
EST. GFR  (NO RACE VARIABLE): >60 ML/MIN/1.73 M^2
ESTIMATED AVG GLUCOSE: 120 MG/DL (ref 68–131)
FRACTIONAL SHORTENING: 35.6 % (ref 28–44)
GLUCOSE SERPL-MCNC: 89 MG/DL (ref 70–110)
HBA1C MFR BLD: 5.8 % (ref 4–5.6)
HCT VFR BLD AUTO: 44.9 % (ref 37–48.5)
HGB BLD-MCNC: 15 G/DL (ref 12–16)
IMM GRANULOCYTES # BLD AUTO: 0.03 K/UL (ref 0–0.04)
IMM GRANULOCYTES NFR BLD AUTO: 0.3 % (ref 0–0.5)
INTERVENTRICULAR SEPTUM: 1.2 CM (ref 0.6–1.1)
IVC DIAMETER: 1.35 CM
IVRT: 110 MSEC
LA MAJOR: 4.8 CM
LA MINOR: 4.9 CM
LA WIDTH: 4.1 CM
LEFT ATRIUM SIZE: 3.4 CM
LEFT ATRIUM VOLUME INDEX: 31 ML/M2
LEFT ATRIUM VOLUME: 57 CM3
LEFT INTERNAL DIMENSION IN SYSTOLE: 2.9 CM (ref 2.1–4)
LEFT VENTRICLE DIASTOLIC VOLUME INDEX: 49.04 ML/M2
LEFT VENTRICLE DIASTOLIC VOLUME: 92.2 ML
LEFT VENTRICLE MASS INDEX: 105.4 G/M2
LEFT VENTRICLE SYSTOLIC VOLUME INDEX: 17.8 ML/M2
LEFT VENTRICLE SYSTOLIC VOLUME: 33.38 ML
LEFT VENTRICULAR INTERNAL DIMENSION IN DIASTOLE: 4.5 CM (ref 3.5–6)
LEFT VENTRICULAR MASS: 198.1 G
LV LATERAL E/E' RATIO: 11.4 M/S
LV SEPTAL E/E' RATIO: 16 M/S
LVED V (TEICH): 92.2 ML
LVES V (TEICH): 33.38 ML
LVOT MG: 3.4 MMHG
LVOT MV: 0.89 CM/S
LYMPHOCYTES # BLD AUTO: 4.3 K/UL (ref 1–4.8)
LYMPHOCYTES NFR BLD: 45.6 % (ref 18–48)
MAGNESIUM SERPL-MCNC: 2 MG/DL (ref 1.6–2.6)
MCH RBC QN AUTO: 31.6 PG (ref 27–31)
MCHC RBC AUTO-ENTMCNC: 33.4 G/DL (ref 32–36)
MCV RBC AUTO: 95 FL (ref 82–98)
MONOCYTES # BLD AUTO: 0.7 K/UL (ref 0.3–1)
MONOCYTES NFR BLD: 7.8 % (ref 4–15)
MV MEAN GRADIENT: 2 MMHG
MV PEAK A VEL: 1 M/S
MV PEAK E VEL: 0.8 M/S
MV PEAK GRADIENT: 6 MMHG
MV STENOSIS PRESSURE HALF TIME: 95.54 MS
MV VALVE AREA BY CONTINUITY EQUATION: 2.53 CM2
MV VALVE AREA P 1/2 METHOD: 2.3 CM2
NEUTROPHILS # BLD AUTO: 4.2 K/UL (ref 1.8–7.7)
NEUTROPHILS NFR BLD: 44.3 % (ref 38–73)
NRBC BLD-RTO: 0 /100 WBC
OHS CV RV/LV RATIO: 0.73 CM
PHOSPHATE SERPL-MCNC: 4.5 MG/DL (ref 2.7–4.5)
PISA TR MAX VEL: 2.8 M/S
PLATELET # BLD AUTO: 202 K/UL (ref 150–450)
PMV BLD AUTO: 9.4 FL (ref 9.2–12.9)
POTASSIUM SERPL-SCNC: 3.5 MMOL/L (ref 3.5–5.1)
PROT SERPL-MCNC: 6.3 G/DL (ref 6–8.4)
PV PEAK GRADIENT: 2 MMHG
PV PEAK VELOCITY: 0.64 M/S
RA MAJOR: 4.59 CM
RA PRESSURE ESTIMATED: 3 MMHG
RA WIDTH: 3.3 CM
RBC # BLD AUTO: 4.74 M/UL (ref 4–5.4)
RIGHT VENTRICLE DIASTOLIC BASEL DIMENSION: 3.3 CM
RIGHT VENTRICULAR END-DIASTOLIC DIMENSION: 3.33 CM
RV TB RVSP: 6 MMHG
RV TISSUE DOPPLER FREE WALL SYSTOLIC VELOCITY 1 (APICAL 4 CHAMBER VIEW): 13.23 CM/S
SINUS: 3.33 CM
SODIUM SERPL-SCNC: 139 MMOL/L (ref 136–145)
STJ: 2.01 CM
TDI LATERAL: 0.07 M/S
TDI SEPTAL: 0.05 M/S
TDI: 0.06 M/S
TR MAX PG: 31 MMHG
TRICUSPID ANNULAR PLANE SYSTOLIC EXCURSION: 1.59 CM
TROPONIN I SERPL DL<=0.01 NG/ML-MCNC: <0.006 NG/ML (ref 0–0.03)
TROPONIN I SERPL DL<=0.01 NG/ML-MCNC: <0.006 NG/ML (ref 0–0.03)
TV REST PULMONARY ARTERY PRESSURE: 34 MMHG
WBC # BLD AUTO: 9.4 K/UL (ref 3.9–12.7)
Z-SCORE OF LEFT VENTRICULAR DIMENSION IN END DIASTOLE: -1.54
Z-SCORE OF LEFT VENTRICULAR DIMENSION IN END SYSTOLE: -0.87

## 2025-02-10 PROCEDURE — 94761 N-INVAS EAR/PLS OXIMETRY MLT: CPT

## 2025-02-10 PROCEDURE — 96366 THER/PROPH/DIAG IV INF ADDON: CPT

## 2025-02-10 PROCEDURE — 83735 ASSAY OF MAGNESIUM: CPT

## 2025-02-10 PROCEDURE — 85730 THROMBOPLASTIN TIME PARTIAL: CPT | Mod: 91 | Performed by: HOSPITALIST

## 2025-02-10 PROCEDURE — G0378 HOSPITAL OBSERVATION PER HR: HCPCS

## 2025-02-10 PROCEDURE — 93005 ELECTROCARDIOGRAM TRACING: CPT

## 2025-02-10 PROCEDURE — 27000221 HC OXYGEN, UP TO 24 HOURS

## 2025-02-10 PROCEDURE — 25000242 PHARM REV CODE 250 ALT 637 W/ HCPCS: Performed by: HOSPITALIST

## 2025-02-10 PROCEDURE — 63600175 PHARM REV CODE 636 W HCPCS

## 2025-02-10 PROCEDURE — 84100 ASSAY OF PHOSPHORUS: CPT

## 2025-02-10 PROCEDURE — 94640 AIRWAY INHALATION TREATMENT: CPT | Mod: XB

## 2025-02-10 PROCEDURE — 25000003 PHARM REV CODE 250: Performed by: PHYSICIAN ASSISTANT

## 2025-02-10 PROCEDURE — 99204 OFFICE O/P NEW MOD 45 MIN: CPT | Mod: 25,,, | Performed by: INTERNAL MEDICINE

## 2025-02-10 PROCEDURE — 80053 COMPREHEN METABOLIC PANEL: CPT

## 2025-02-10 PROCEDURE — 85025 COMPLETE CBC W/AUTO DIFF WBC: CPT

## 2025-02-10 PROCEDURE — 25000003 PHARM REV CODE 250

## 2025-02-10 PROCEDURE — 84484 ASSAY OF TROPONIN QUANT: CPT

## 2025-02-10 PROCEDURE — 93010 ELECTROCARDIOGRAM REPORT: CPT | Mod: ,,, | Performed by: INTERNAL MEDICINE

## 2025-02-10 PROCEDURE — 99900035 HC TECH TIME PER 15 MIN (STAT)

## 2025-02-10 PROCEDURE — 94799 UNLISTED PULMONARY SVC/PX: CPT

## 2025-02-10 RX ORDER — ISOSORBIDE MONONITRATE 30 MG/1
30 TABLET, EXTENDED RELEASE ORAL DAILY
Qty: 30 TABLET | Refills: 1 | Status: SHIPPED | OUTPATIENT
Start: 2025-02-11 | End: 2026-02-11

## 2025-02-10 RX ORDER — METOPROLOL SUCCINATE 50 MG/1
50 TABLET, EXTENDED RELEASE ORAL NIGHTLY
Qty: 30 TABLET | Refills: 1 | Status: SHIPPED | OUTPATIENT
Start: 2025-02-10 | End: 2025-02-10

## 2025-02-10 RX ORDER — METOPROLOL SUCCINATE 100 MG/1
100 TABLET, EXTENDED RELEASE ORAL NIGHTLY
Qty: 30 TABLET | Refills: 1 | Status: SHIPPED | OUTPATIENT
Start: 2025-02-10

## 2025-02-10 RX ORDER — OMEPRAZOLE 40 MG/1
40 CAPSULE, DELAYED RELEASE ORAL
COMMUNITY
Start: 2025-02-06

## 2025-02-10 RX ORDER — ISOSORBIDE MONONITRATE 30 MG/1
30 TABLET, EXTENDED RELEASE ORAL DAILY
Status: DISCONTINUED | OUTPATIENT
Start: 2025-02-10 | End: 2025-02-10 | Stop reason: HOSPADM

## 2025-02-10 RX ADMIN — FUROSEMIDE 20 MG: 20 TABLET ORAL at 08:02

## 2025-02-10 RX ADMIN — IPRATROPIUM BROMIDE AND ALBUTEROL SULFATE 3 ML: 2.5; .5 SOLUTION RESPIRATORY (INHALATION) at 07:02

## 2025-02-10 RX ADMIN — ISOSORBIDE MONONITRATE 30 MG: 30 TABLET, EXTENDED RELEASE ORAL at 09:02

## 2025-02-10 RX ADMIN — FLUOXETINE HYDROCHLORIDE 20 MG: 20 CAPSULE ORAL at 08:02

## 2025-02-10 RX ADMIN — AMLODIPINE BESYLATE 5 MG: 5 TABLET ORAL at 08:02

## 2025-02-10 RX ADMIN — PANTOPRAZOLE SODIUM 40 MG: 40 TABLET, DELAYED RELEASE ORAL at 08:02

## 2025-02-10 RX ADMIN — ASPIRIN 81 MG: 81 TABLET, COATED ORAL at 08:02

## 2025-02-10 RX ADMIN — BUSPIRONE HYDROCHLORIDE 7.5 MG: 5 TABLET ORAL at 08:02

## 2025-02-10 RX ADMIN — MIRTAZAPINE 15 MG: 15 TABLET, FILM COATED ORAL at 12:02

## 2025-02-10 RX ADMIN — CLOPIDOGREL BISULFATE 75 MG: 75 TABLET ORAL at 08:02

## 2025-02-10 RX ADMIN — ATORVASTATIN CALCIUM 80 MG: 40 TABLET, FILM COATED ORAL at 08:02

## 2025-02-10 RX ADMIN — HEPARIN SODIUM 8 UNITS/KG/HR: 10000 INJECTION, SOLUTION INTRAVENOUS at 07:02

## 2025-02-10 RX ADMIN — MONTELUKAST 10 MG: 10 TABLET, FILM COATED ORAL at 08:02

## 2025-02-10 RX ADMIN — IPRATROPIUM BROMIDE AND ALBUTEROL SULFATE 3 ML: 2.5; .5 SOLUTION RESPIRATORY (INHALATION) at 12:02

## 2025-02-10 NOTE — NURSING
Notified by lab that patient's ptt is greater than 150. Notified Phlebotomist at this time. Phlebotomist to redraw ptt.

## 2025-02-10 NOTE — PLAN OF CARE
Patient alert and oriented x4. Respirations even and unlabored. 02 at 2l. No distress noted. Skin warm and dry. Iv x2. Iv saline locked. Iv heparin on hold at this time. Patient denies pain. Pain medication available as needed. Patient ambulated to bathroom with standby assist as needed. Patient has no complaints at this time. Bed in lowest position. Call bell within reach. Instructed to call for any needs.     Problem: Adult Inpatient Plan of Care  Goal: Plan of Care Review  Outcome: Progressing  Flowsheets (Taken 2/10/2025 0513)  Plan of Care Reviewed With: patient  Goal: Patient-Specific Goal (Individualized)  Outcome: Progressing  Goal: Absence of Hospital-Acquired Illness or Injury  Outcome: Progressing  Intervention: Identify and Manage Fall Risk  Flowsheets (Taken 2/10/2025 0513)  Safety Promotion/Fall Prevention:   assistive device/personal item within reach   Fall Risk reviewed with patient/family   high risk medications identified   medications reviewed     Problem: Acute Coronary Syndrome  Goal: Optimal Adaptation to Illness  Outcome: Progressing  Intervention: Support Adjustment to Life-Change Event  Flowsheets (Taken 2/10/2025 0513)  Supportive Measures: active listening utilized

## 2025-02-10 NOTE — HPI
Carolyn Piper is a 69 y.o. female with  PAD s/p aorto-femoral bypass, HTN, HLD, ESRD on HD ( ), and Anxiety who presented to [facility] ED on 02/09/2025 for eval and treatment of chest pain.  They describe their pain as pressure, 6/10, located beneath the sternum with radiation to her L shoulder and arm.  It started 30 minutes prior to presentation and has been intermittent since.  There is associated dyspnea, nausea, and diaphoresis.  They deny associated fever, chills, known recent illness.  Symptoms are alleviated by NTG.   No notable recent healthcare encounters.    ED course notable for the following: Trop and BNP negative.  EKG without ST changes.  Imaging: CXR clear.  ED therapy/stabilization measures: DAPT load, heparin gtt, NTG.  They were placed in observation under the care of [facility] for further evaluation and treatment.

## 2025-02-10 NOTE — SUBJECTIVE & OBJECTIVE
Past Medical History:   Diagnosis Date    COPD (chronic obstructive pulmonary disease)     H/O tubal ligation        Past Surgical History:   Procedure Laterality Date    ANKLE SURGERY      BREAST BIOPSY Left     30 yrs ago/ benign/ ex bx    BREAST BIOPSY Right     25 yrs ago/ benign/ ex bx    CHOLECYSTECTOMY      TUBAL LIGATION         Review of patient's allergies indicates:   Allergen Reactions    Fish containing products     Tetracyclines        No current facility-administered medications on file prior to encounter.     Current Outpatient Medications on File Prior to Encounter   Medication Sig    amLODIPine (NORVASC) 5 MG tablet Take 5 mg by mouth once daily.    aspirin (ECOTRIN) 81 MG EC tablet Take 81 mg by mouth once daily.    busPIRone (BUSPAR) 7.5 MG tablet Take 7.5 mg by mouth as needed.    COMBIVENT RESPIMAT  mcg/actuation inhaler Inhale 1 puff into the lungs 2 (two) times a day.    FLUoxetine 20 MG capsule Take 20 mg by mouth once daily.    furosemide (LASIX) 20 MG tablet Take 20 mg by mouth once daily.    gabapentin (NEURONTIN) 300 MG capsule Take 300 mg by mouth 2 (two) times daily as needed.    hydrOXYzine HCL (ATARAX) 25 MG tablet Take 25 mg by mouth 3 (three) times daily as needed for Itching or Anxiety.    LINZESS 145 mcg Cap capsule Take 145 mcg by mouth daily as needed.    metoclopramide HCl (REGLAN) 5 MG tablet Take 5 mg by mouth 3 (three) times daily.    metoprolol succinate (TOPROL-XL) 200 MG 24 hr tablet Take 200 mg by mouth every evening.    omeprazole (PRILOSEC) 40 MG capsule Take 40 mg by mouth.    rosuvastatin (CRESTOR) 10 MG tablet Take 10 mg by mouth every evening.    hydrOXYzine pamoate (VISTARIL) 50 MG Cap Take 1 capsule (50 mg total) by mouth 4 (four) times daily as needed. MAY CAUSE DROWSINESS (Patient not taking: Reported on 2/10/2025)    ibuprofen (ADVIL,MOTRIN) 200 MG tablet Take 200 mg by mouth every 6 (six) hours as needed. (Patient not taking: Reported on 2/10/2025)     lisinopril-hydrochlorothiazide (PRINZIDE,ZESTORETIC) 20-25 mg Tab Take 1 tablet by mouth once daily.    mirtazapine (REMERON) 15 MG tablet Take 15 mg by mouth every evening.    montelukast (SINGULAIR) 10 mg tablet Take 10 mg by mouth once daily. (Patient not taking: Reported on 2/10/2025)    ondansetron (ZOFRAN-ODT) 4 MG TbDL Take 1 tablet (4 mg total) by mouth every 12 (twelve) hours as needed (vomiting).    polyethylene glycol (GLYCOLAX) 17 gram/dose powder Take 17 g by mouth once daily.    traMADol (ULTRAM) 50 mg tablet Take 1 tablet (50 mg total) by mouth every 6 (six) hours as needed for Pain.    [DISCONTINUED] pantoprazole (PROTONIX) 40 MG tablet Take 1 tablet (40 mg total) by mouth once daily.     Family History    None       Tobacco Use    Smoking status: Every Day     Current packs/day: 0.50     Types: Cigarettes    Smokeless tobacco: Never   Substance and Sexual Activity    Alcohol use: No    Drug use: No    Sexual activity: Not on file     Review of Systems   Cardiovascular:  Positive for chest pain. Negative for claudication, dyspnea on exertion, irregular heartbeat, leg swelling, near-syncope, orthopnea, palpitations, paroxysmal nocturnal dyspnea and syncope.   Respiratory:  Negative for cough, shortness of breath, snoring and sputum production.    Gastrointestinal:  Negative for abdominal pain, dysphagia, heartburn, nausea and vomiting.   Neurological:  Negative for dizziness, headaches, loss of balance and weakness.     Objective:     Vital Signs (Most Recent):  Temp: 97.8 °F (36.6 °C) (02/10/25 0715)  Pulse: (!) 53 (02/10/25 1000)  Resp: 16 (02/10/25 1000)  BP: 120/60 (02/10/25 0715)  SpO2: (!) 92 % (02/10/25 1000) Vital Signs (24h Range):  Temp:  [97.4 °F (36.3 °C)-97.8 °F (36.6 °C)] 97.8 °F (36.6 °C)  Pulse:  [48-66] 53  Resp:  [14-47] 16  SpO2:  [91 %-98 %] 92 %  BP: (104-133)/(54-61) 120/60     Weight: 78.5 kg (173 lb)  Body mass index is 27.92 kg/m².    SpO2: (!) 92 %         Intake/Output  Summary (Last 24 hours) at 2/10/2025 1045  Last data filed at 2/10/2025 0516  Gross per 24 hour   Intake 601.08 ml   Output --   Net 601.08 ml       Lines/Drains/Airways       Peripheral Intravenous Line  Duration                  Peripheral IV - Single Lumen 02/09/25 22 G Left Antecubital 1 day         Peripheral IV - Single Lumen 02/09/25 1712 20 G 1 in Anterior;Right Forearm <1 day                     Physical Exam  HENT:      Head: Normocephalic and atraumatic.      Mouth/Throat:      Mouth: Mucous membranes are moist.   Eyes:      Extraocular Movements: Extraocular movements intact.      Pupils: Pupils are equal, round, and reactive to light.   Cardiovascular:      Rate and Rhythm: Normal rate and regular rhythm.      Pulses: Normal pulses.      Heart sounds: Normal heart sounds.   Pulmonary:      Effort: Pulmonary effort is normal.      Breath sounds: Normal breath sounds.   Abdominal:      General: Bowel sounds are normal.      Palpations: Abdomen is soft.   Musculoskeletal:      Left lower leg: No edema.   Skin:     General: Skin is warm.   Neurological:      General: No focal deficit present.      Mental Status: She is alert.   Psychiatric:         Mood and Affect: Mood normal.         Behavior: Behavior normal.          Current Medications:   albuterol-ipratropium  3 mL Nebulization Q6H    amLODIPine  5 mg Oral Daily    aspirin  81 mg Oral Daily    atorvastatin  80 mg Oral Daily    busPIRone  7.5 mg Oral BID    clopidogreL  75 mg Oral Daily    FLUoxetine  20 mg Oral Daily    furosemide  20 mg Oral Daily    isosorbide mononitrate  30 mg Oral Daily    mirtazapine  15 mg Oral QHS    montelukast  10 mg Oral Daily    pantoprazole  40 mg Oral Daily         Current Facility-Administered Medications:     acetaminophen, 650 mg, Oral, Q6H PRN    aluminum-magnesium hydroxide-simethicone, 30 mL, Oral, Q6H PRN    benzonatate, 100 mg, Oral, TID PRN    gabapentin, 300 mg, Oral, BID PRN    guaiFENesin 100 mg/5 ml, 200 mg,  Oral, Q4H PRN    hydrALAZINE, 10 mg, Intravenous, Q6H PRN    hydrOXYzine HCL, 25 mg, Oral, TID PRN    loperamide, 4 mg, Oral, Once PRN    melatonin, 6 mg, Oral, Nightly PRN    nitroGLYCERIN, 0.4 mg, Sublingual, Q5 Min PRN    ondansetron, 4 mg, Intravenous, Once PRN    senna-docusate 8.6-50 mg, 1 tablet, Oral, Daily PRN    simethicone, 1 tablet, Oral, TID PRN    traMADoL, 50 mg, Oral, Q6H PRN    Laboratory (all labs reviewed):  CBC:  Recent Labs   Lab 02/09/25  1711 02/10/25  0339   WBC 11.29 9.40   Hemoglobin 17.4 H 15.0   Hematocrit 49.8 H 44.9   Platelets 216 202       CHEMISTRIES:  Recent Labs   Lab 04/01/23  0412 04/02/23  0407 04/03/23  0422 02/09/25  1711 02/10/25  0339   Glucose  --   --   --  111 H 89   Sodium 136 137 139 134 L 139   Potassium 3.7 3.7 3.5 4.2 3.5   BUN 9.8 9.9 10.4 12 14   Creatinine 0.74 0.84 0.70 1.0 1.0   eGFR 89 L 76 L 95 >60 >60   Calcium 8.4 L 8.9 8.3 L 8.4 L 8.5 L   Magnesium  --   --   --  1.7 2.0       CARDIAC BIOMARKERS:  Recent Labs   Lab 02/09/25  1711 02/09/25  2110 02/10/25  0036 02/10/25  0339   Troponin I <0.006  <0.006 <0.006 <0.006 <0.006       COAGS:  Recent Labs   Lab 02/22/23  0909 02/09/25 2110   INR 0.9 1.0       LIPIDS/LFTS:  Recent Labs   Lab 02/27/23  1550 02/28/23  0504 03/29/23  2101 02/09/25  1711 02/09/25  2110 02/10/25  0339   Cholesterol  --   --   --   --  132  --    Triglycerides  --   --   --   --  89  --    HDL  --   --   --   --  52  --    LDL Cholesterol  --   --   --   --  62.2 L  --    Non-HDL Cholesterol  --   --   --   --  80  --    AST 29 23 32 33  --  26   ALT 8 L 7 L 25 33  --  25       BNP:  Recent Labs   Lab 02/09/25  1711    H       TSH:  Recent Labs   Lab 02/09/25  1711   TSH 1.986       Free T4:        Diagnostic Results:  ECG (personally reviewed and interpreted tracing(s)):    EKG done on 9th February 2025 showed sinus bradycardia without any ST-T wave change    Chest X-Ray (personally reviewed and interpreted image(s)):  no acute  cardiopulmonary process    Echo: 6/2015    1 - Normal left ventricular systolic function (EF 55-60%).     2 - Concentric hypertrophy.     3 - Left ventricular diastolic dysfunction.     Stress Test: NA    Cath: NA

## 2025-02-10 NOTE — SUBJECTIVE & OBJECTIVE
Past Medical History:   Diagnosis Date    COPD (chronic obstructive pulmonary disease)     H/O tubal ligation        Past Surgical History:   Procedure Laterality Date    ANKLE SURGERY      BREAST BIOPSY Left     30 yrs ago/ benign/ ex bx    BREAST BIOPSY Right     25 yrs ago/ benign/ ex bx    CHOLECYSTECTOMY      TUBAL LIGATION         Review of patient's allergies indicates:   Allergen Reactions    Fish containing products     Tetracyclines        No current facility-administered medications on file prior to encounter.     Current Outpatient Medications on File Prior to Encounter   Medication Sig    amLODIPine (NORVASC) 5 MG tablet Take 5 mg by mouth once daily.    busPIRone (BUSPAR) 7.5 MG tablet Take 7.5 mg by mouth 2 (two) times daily.    COMBIVENT RESPIMAT  mcg/actuation inhaler Inhale 1 puff into the lungs 2 (two) times a day.    FLUoxetine 20 MG capsule Take 20 mg by mouth once daily.    furosemide (LASIX) 20 MG tablet Take 20 mg by mouth once daily.    hydrOXYzine HCL (ATARAX) 25 MG tablet Take 25 mg by mouth 3 (three) times daily as needed for Itching or Anxiety.    LINZESS 145 mcg Cap capsule Take 145 mcg by mouth daily as needed.    metoclopramide HCl (REGLAN) 5 MG tablet Take 5 mg by mouth 3 (three) times daily.    rosuvastatin (CRESTOR) 10 MG tablet Take 10 mg by mouth every evening.    aspirin (ECOTRIN) 81 MG EC tablet Take 81 mg by mouth once daily.    gabapentin (NEURONTIN) 300 MG capsule Take 300 mg by mouth 2 (two) times daily as needed.    hydrOXYzine pamoate (VISTARIL) 50 MG Cap Take 1 capsule (50 mg total) by mouth 4 (four) times daily as needed. MAY CAUSE DROWSINESS    ibuprofen (ADVIL,MOTRIN) 200 MG tablet Take 200 mg by mouth every 6 (six) hours as needed.    lisinopril-hydrochlorothiazide (PRINZIDE,ZESTORETIC) 20-25 mg Tab Take 1 tablet by mouth once daily.    metoprolol succinate (TOPROL-XL) 200 MG 24 hr tablet Take 200 mg by mouth every evening.    mirtazapine (REMERON) 15 MG tablet  Take 15 mg by mouth every evening.    montelukast (SINGULAIR) 10 mg tablet Take 10 mg by mouth once daily.    ondansetron (ZOFRAN-ODT) 4 MG TbDL Take 1 tablet (4 mg total) by mouth every 12 (twelve) hours as needed (vomiting).    pantoprazole (PROTONIX) 40 MG tablet Take 1 tablet (40 mg total) by mouth once daily.    polyethylene glycol (GLYCOLAX) 17 gram/dose powder Take 17 g by mouth once daily.    traMADol (ULTRAM) 50 mg tablet Take 1 tablet (50 mg total) by mouth every 6 (six) hours as needed for Pain.     Family History    None       Tobacco Use    Smoking status: Every Day     Current packs/day: 0.50     Types: Cigarettes    Smokeless tobacco: Never   Substance and Sexual Activity    Alcohol use: No    Drug use: No    Sexual activity: Not on file     Review of Systems   Reason unable to perform ROS: ROS was performed and pertinent +s and -s are listed in HPI.     Objective:     Vital Signs (Most Recent):  Pulse: (!) 52 (02/10/25 0100)  Resp: (!) 26 (02/10/25 0100)  BP: (!) 125/59 (02/10/25 0100)  SpO2: (!) 91 % (02/10/25 0100) Vital Signs (24h Range):  Pulse:  [48-60] 52  Resp:  [17-47] 26  SpO2:  [90 %-95 %] 91 %  BP: (104-125)/(54-60) 125/59     Weight: 78.5 kg (173 lb)  Body mass index is 27.92 kg/m².     Physical Exam  Constitutional:       General: She is not in acute distress.     Appearance: She is underweight. She is not ill-appearing or toxic-appearing.   HENT:      Head: Normocephalic.   Cardiovascular:      Rate and Rhythm: Normal rate and regular rhythm.      Pulses: Normal pulses.      Heart sounds: Normal heart sounds.   Pulmonary:      Effort: Pulmonary effort is normal.      Breath sounds: No rales.   Abdominal:      General: Abdomen is flat. Bowel sounds are normal.      Tenderness: There is no abdominal tenderness. There is no guarding.   Musculoskeletal:      Right lower leg: No edema.      Left lower leg: No edema.   Skin:     General: Skin is warm and dry.      Capillary Refill: Capillary  refill takes less than 2 seconds.      Coloration: Skin is not jaundiced.   Neurological:      General: No focal deficit present.      Mental Status: She is alert and oriented to person, place, and time.   Psychiatric:         Mood and Affect: Mood normal.         Behavior: Behavior normal.                Significant Labs: All pertinent labs within the past 24 hours have been reviewed.  CBC:   Recent Labs   Lab 02/09/25  1711   WBC 11.29   HGB 17.4*   HCT 49.8*        CMP:   Recent Labs   Lab 02/09/25  1711   *   K 4.2      CO2 22*   *   BUN 12   CREATININE 1.0   CALCIUM 8.4*   PROT 6.8   ALBUMIN 3.3*   BILITOT 0.5   ALKPHOS 52   AST 33   ALT 33   ANIONGAP 11     Cardiac Markers:   Recent Labs   Lab 02/09/25  1711   *     Troponin:   Recent Labs   Lab 02/09/25  1711 02/09/25  2110   TROPONINI <0.006  <0.006 <0.006       Significant Imaging: I have reviewed all pertinent imaging results/findings within the past 24 hours.

## 2025-02-10 NOTE — ASSESSMENT & PLAN NOTE
H/O aorto-femoral bypass  HLD  HTN    Patient describes chest pain as constant substernal pressure that is relieved by NTG  Their known cardiac risk factors include female older than 55 years of age, hyperlipidemia, hypertension, obesity, and sedentary lifestyle.  EKG (see above) without ST changes c/f ischemia  Most recent Troponins (starting w latest at top):  Lab Results   Component Value Date    TROPONINI <0.006 02/09/2025    TROPONINI <0.006 02/09/2025    TROPONINI <0.006 02/09/2025    TROPONINI 0.010 11/05/2020    TROPONINI <0.006 10/29/2020      Lab Results   Component Value Date    CREATININE 1.0 02/09/2025       CHRISTELLE 4      We are concerned their chest pain may be ischemic in nature per the above data.  Admitting them to the hospital for further eval and treatment:    Cardiology consulted  Pt on UA/NSTEMI pathway  Start ACS protocol with  mg load followed by 81 mg qd, P2Y12 inhibitor load followed by maintenance dosage, and heparin gtt  High intensity Statin: atorvastatin 80  start B-blocker as tolerated; hold if pt develops symptoms concerning for active heart failure  start ACEi/ARB as tolerated  TTE pending  Trend troponin (to peak or flat) and EKGs q6hr, or sooner if pt's sxs worsen  Lipid panel, TSH, A1c  NTG PRN for chest pain  Low sodium diet, NPO at MN

## 2025-02-10 NOTE — HPI
Ms. Arndt a 69-year-old female with past medical history of hypertension, hyperlipidemia, peripheral arterial disease status post aortobifemoral bypass and right common femoral endarterectomy (2023) and COPD who is coming to the hospital for evaluation of chest pain.    Cardiology is consulted for further evaluation.      She was in her usual state of health until yesterday when after finishing mopping of the kitchen floor she started having chest discomfort.  It was on the left side of the chest and was associated with mild shortness of breath.  Because of persistent pain, she came to the ED for further evaluation.    EKG did not show any acute ST-T wave change.  Troponin has been negative.  She has been started on heparin drip.    This morning, when I saw her, she was hemodynamically stable.  She did not have any chest pain.    She follows up with vascular surgery at [facility].     She has extensive smoking history but now trying to cut down.  Currently she  smokes 2 cigarettes a day.

## 2025-02-10 NOTE — NURSING
Called lab back at this time because ptt results are not in system. Makayla in lab states that she cancelled lab because I told her I wanted a recollect. Explained that I did want a recollect but needed the results in epic so provider and others could see. Unable to see first ptt results because it was cancelled. Will await new ptt results and follow nomogram.

## 2025-02-10 NOTE — PLAN OF CARE
Case Management Assessment     PCP: Xiomy Ruano  Pharmacy: [facility]    Patient Arrived From: home  Existing Help at Home: spouse    Barriers to Discharge: none    Discharge Plan:    A. Home with family   B. Home with family     02/10/25 3247   Discharge Planning   Assessment Type Final Discharge Note   Resource/Environmental Concerns none   Support Systems Spouse/significant other   Equipment Currently Used at Home none   Current Living Arrangements home   Patient/Family Anticipates Transition to home with family   Patient/Family Anticipated Services at Transition none   DME Needed Upon Discharge  none   Discharge Plan A Home with family   Discharge Plan B Home with family

## 2025-02-10 NOTE — NURSING
Report given at this time from Loida Alvarado. Care assumed at this time. Patient lying in bed watching tv. Denies chest pain at this time. Heparin infusing at 12 units/kg/hr. Next ptt to be drawn at 0340. Patient has no complaints at this time. Bed in lowest position. Call bell within reach. Instructed to call for any needs.

## 2025-02-10 NOTE — ASSESSMENT & PLAN NOTE
Patient coming in to the hospital with chest pain which started after mopping the floor   EKG did not show any acute ST-T wave change and troponin negative   Currently hemodynamically stable    Most likely anginal pain   No ACS   We will medically manage with antianginal therapy   Start Imdur 30 mg daily   Due to bradycardia, decrease metoprolol to 100 mg daily   Continue amlodipine 5 mg daily   Continue with baby aspirin and rosuvastatin 10 mg daily   Echo pending.  If echo is unremarkable, can be discharged from cardiac perspective.   Follow with her cardiologist at [facility]

## 2025-02-10 NOTE — ASSESSMENT & PLAN NOTE
Patient's blood pressure range in the last 24 hours was: BP  Min: 104/54  Max: 125/59.The patient's inpatient anti-hypertensive regimen is listed below:  Current Antihypertensives  nitroGLYCERIN SL tablet 0.4 mg, Every 5 min PRN, Sublingual  hydrALAZINE injection 10 mg, Every 6 hours PRN, Intravenous  , Daily, Oral  , Daily, Oral  , Nightly, Oral  amLODIPine tablet 5 mg, Daily, Oral  furosemide tablet 20 mg, Daily, Oral  metoprolol tartrate (LOPRESSOR) tablet 50 mg, 4 times daily, Oral    Plan  - BP is controlled, no changes needed to their regimen

## 2025-02-10 NOTE — DISCHARGE SUMMARY
[facility] Emergency Dept  [facility]  Discharge Summary      Patient Name: Carolyn Piper  MRN: [id number]  BERNICE: [id number]  Patient Class: OP- Observation  Admission Date: 2/9/2025  Hospital Length of Stay: 0 days  Discharge Date and Time:  02/10/2025 12:16 PM  Attending Physician: Ran Muro MD   Discharging Provider: Chay Moeller PA-C  Primary Care Provider: Macy Primary Doctor    Primary Care Team: CHAY MOELLER    HPI:   Carolyn Piper is a 69 y.o. female with  PAD s/p aorto-femoral bypass, HTN, HLD, ESRD on HD ( ), and Anxiety who presented to [facility] ED on 02/09/2025 for eval and treatment of chest pain.  They describe their pain as pressure, 6/10, located beneath the sternum with radiation to her L shoulder and arm.  It started 30 minutes prior to presentation and has been intermittent since.  There is associated dyspnea, nausea, and diaphoresis.  They deny associated fever, chills, known recent illness.  Symptoms are alleviated by NTG.   No notable recent healthcare encounters.    ED course notable for the following: Trop and BNP negative.  EKG without ST changes.  Imaging: CXR clear.  ED therapy/stabilization measures: DAPT load, heparin gtt, NTG.  They were placed in observation under the care of [facility] for further evaluation and treatment.        * No surgery found *      Hospital Course:   Carolyn Piper 69 y.o. female placed in observation for chest pain. In the ED, afebrile without leukocytosis, EKG without ST elevation, sinus bradycardia on telemetry, chest x-ray without acute process. She was seen by cardiology and underwent an echo without evidence of wall motion, but hyperdynamic EF. No further inpatient cardiac testing was planned. Her medications were adjusted and metoprolol dose decreased given bradycardia, amlodipine D/C and started on imdur. She reports she can see her [facility] Cardiologist 2/11. At discharge she was on RA and chest pain  free.      Goals of Care Treatment Preferences:  Code Status: Full Code         Consults:   Consults (From admission, onward)          Status Ordering Provider     Inpatient consult to Cardiology  Once        Provider:  Molly Torres MD    Completed HORACE MAX     Inpatient consult to Registered Dietitian/Nutritionist  Once        Provider:  (Not yet assigned)    Acknowledged HORACE MAX     Inpatient consult to Social Work/Case Management  Once        Provider:  (Not yet assigned)    Acknowledged HORACE MAX            * Unstable angina  H/O aorto-femoral bypass  HLD  HTN    Patient describes chest pain as constant substernal pressure that is relieved by NTG  Their known cardiac risk factors include female older than 55 years of age, hyperlipidemia, hypertension, obesity, and sedentary lifestyle.  EKG (see above) without ST changes c/f ischemia  Most recent Troponins (starting w latest at top):  Lab Results   Component Value Date    TROPONINI <0.006 02/09/2025    TROPONINI <0.006 02/09/2025    TROPONINI <0.006 02/09/2025    TROPONINI 0.010 11/05/2020    TROPONINI <0.006 10/29/2020      Lab Results   Component Value Date    CREATININE 1.0 02/09/2025       CHRISTELLE 4      We are concerned their chest pain may be ischemic in nature per the above data.  Admitting them to the hospital for further eval and treatment:    Cardiology consulted  Pt on UA/NSTEMI pathway  Start ACS protocol with  mg load followed by 81 mg qd, P2Y12 inhibitor load followed by maintenance dosage, and heparin gtt  High intensity Statin: atorvastatin 80  start B-blocker as tolerated; hold if pt develops symptoms concerning for active heart failure  start ACEi/ARB as tolerated  TTE pending  Trend troponin (to peak or flat) and EKGs q6hr, or sooner if pt's sxs worsen  Lipid panel, TSH, A1c  NTG PRN for chest pain  Low sodium diet, NPO at MN      COPD (chronic obstructive pulmonary disease)  Patient's COPD is controlled  currently.  Patient is currently off COPD Pathway. Continue scheduled inhalers and monitor respiratory status closely.     ESRD (end stage renal disease) on dialysis  Creatine stable for now. BMP reviewed- noted Estimated Creatinine Clearance: 56.2 mL/min (based on SCr of 1 mg/dL). according to latest data. Based on current GFR, CKD stage is end stage.  Monitor UOP and serial BMP and adjust therapy as needed. Renally dose meds. Avoid nephrotoxic medications and procedures.  Consulting Nephrology.    Essential hypertension  Patient's blood pressure range in the last 24 hours was: BP  Min: 104/54  Max: 125/59.The patient's inpatient anti-hypertensive regimen is listed below:  Current Antihypertensives  nitroGLYCERIN SL tablet 0.4 mg, Every 5 min PRN, Sublingual  hydrALAZINE injection 10 mg, Every 6 hours PRN, Intravenous  , Daily, Oral  , Daily, Oral  , Nightly, Oral  amLODIPine tablet 5 mg, Daily, Oral  furosemide tablet 20 mg, Daily, Oral  metoprolol tartrate (LOPRESSOR) tablet 50 mg, 4 times daily, Oral    Plan  - BP is controlled, no changes needed to their regimen        Final Active Diagnoses:    Diagnosis Date Noted POA    PRINCIPAL PROBLEM:  Unstable angina [I20.0] 02/10/2025 Yes    H/O aorto-femoral bypass [Z95.828] 04/19/2023 Not Applicable    ESRD (end stage renal disease) on dialysis [N18.6, Z99.2] 03/29/2023 Not Applicable    Essential hypertension [I10] 11/10/2022 Yes    HLD (hyperlipidemia) [E78.5] 11/10/2022 Yes    PAD (peripheral artery disease) [I73.9] 11/10/2022 Yes    COPD (chronic obstructive pulmonary disease) [J44.9] 11/10/2022 Yes      Problems Resolved During this Admission:       Discharged Condition: stable    Disposition: Home or Self Care    Follow Up:    Patient Instructions:      Diet Cardiac     Notify your health care provider if you experience any of the following:  temperature >100.4     Notify your health care provider if you experience any of the following:  persistent nausea and  vomiting or diarrhea     Notify your health care provider if you experience any of the following:  increased confusion or weakness     Notify your health care provider if you experience any of the following:  persistent dizziness, light-headedness, or visual disturbances     Activity as tolerated       Significant Diagnostic Studies: Labs: CMP   Recent Labs   Lab 02/09/25  1711 02/10/25  0339   * 139   K 4.2 3.5    103   CO2 22* 23   * 89   BUN 12 14   CREATININE 1.0 1.0   CALCIUM 8.4* 8.5*   PROT 6.8 6.3   ALBUMIN 3.3* 3.2*   BILITOT 0.5 0.4   ALKPHOS 52 45   AST 33 26   ALT 33 25   ANIONGAP 11 13    and CBC   Recent Labs   Lab 02/09/25  1711 02/10/25  0339   WBC 11.29 9.40   HGB 17.4* 15.0   HCT 49.8* 44.9    202       Pending Diagnostic Studies:       Procedure Component Value Units Date/Time    Hemoglobin A1c [[id number]] Collected: 02/09/25 2110    Order Status: Sent Lab Status: In process Updated: 02/10/25 1123    Specimen: Blood            Medications:  Reconciled Home Medications:      Medication List        START taking these medications      isosorbide mononitrate 30 MG 24 hr tablet  Commonly known as: IMDUR  Take 1 tablet (30 mg total) by mouth once daily.  Start taking on: February 11, 2025            CHANGE how you take these medications      metoprolol succinate 100 MG 24 hr tablet  Commonly known as: TOPROL-XL  Take 1 tablet (100 mg total) by mouth every evening.  What changed:   medication strength  how much to take            CONTINUE taking these medications      aspirin 81 MG EC tablet  Commonly known as: ECOTRIN  Take 81 mg by mouth once daily.     busPIRone 7.5 MG tablet  Commonly known as: BUSPAR  Take 7.5 mg by mouth as needed.     CombiVENT RESPIMAT  mcg/actuation inhaler  Generic drug: ipratropium-albuteroL  Inhale 1 puff into the lungs 2 (two) times a day.     FLUoxetine 20 MG capsule  Take 20 mg by mouth once daily.     furosemide 20 MG tablet  Commonly  known as: LASIX  Take 20 mg by mouth once daily.     gabapentin 300 MG capsule  Commonly known as: NEURONTIN  Take 300 mg by mouth 2 (two) times daily as needed.     hydrOXYzine HCL 25 MG tablet  Commonly known as: ATARAX  Take 25 mg by mouth 3 (three) times daily as needed for Itching or Anxiety.     LINZESS 145 mcg Cap capsule  Generic drug: linaCLOtide  Take 145 mcg by mouth daily as needed.     lisinopriL-hydrochlorothiazide 20-25 mg Tab  Commonly known as: PRINZIDE,ZESTORETIC  Take 1 tablet by mouth once daily.     metoclopramide HCl 5 MG tablet  Commonly known as: REGLAN  Take 5 mg by mouth 3 (three) times daily.     mirtazapine 15 MG tablet  Commonly known as: REMERON  Take 15 mg by mouth every evening.     omeprazole 40 MG capsule  Commonly known as: PRILOSEC  Take 40 mg by mouth.     ondansetron 4 MG Tbdl  Commonly known as: ZOFRAN-ODT  Take 1 tablet (4 mg total) by mouth every 12 (twelve) hours as needed (vomiting).     polyethylene glycol 17 gram/dose powder  Commonly known as: GLYCOLAX  Take 17 g by mouth once daily.     rosuvastatin 10 MG tablet  Commonly known as: CRESTOR  Take 10 mg by mouth every evening.     traMADoL 50 mg tablet  Commonly known as: ULTRAM  Take 1 tablet (50 mg total) by mouth every 6 (six) hours as needed for Pain.            STOP taking these medications      amLODIPine 5 MG tablet  Commonly known as: NORVASC            ASK your doctor about these medications      hydrOXYzine pamoate 50 MG Cap  Commonly known as: VISTARIL  Take 1 capsule (50 mg total) by mouth 4 (four) times daily as needed. MAY CAUSE DROWSINESS     ibuprofen 200 MG tablet  Commonly known as: ADVIL,MOTRIN  Take 200 mg by mouth every 6 (six) hours as needed.     montelukast 10 mg tablet  Commonly known as: SINGULAIR  Take 10 mg by mouth once daily.              Indwelling Lines/Drains at time of discharge:   Lines/Drains/Airways       None                   Time spent on the discharge of patient: 36 minutes          Chay Moeller PA-C  Department of Hospital Medicine  [facility] - [place], [place] - Emergency Dept

## 2025-02-10 NOTE — ED NOTES
"Patient states that her chest pain is now an 8 on pain scale of 0 - 10.  Refuses 3rd dose of Nitro at this time, stating, "My discomfort feels more like pressure than pain.  Statement verbally acknowledged by this Nurse.  Provider notified.  "

## 2025-02-10 NOTE — PROGRESS NOTES
CHW - Initial Contact    This Community Health Worker completed Social Determinant of Health questionnaire with patient  at bedside  today.    Pt identified barriers of most importance are: has no needs at this time.   Referrals to community agencies completed with patient/caregiver consent outside of [facility] include: no: none at this time.  Referrals were put through [facility] - no: none at this time.  Support and Services: has no support at this time.  Other information discussed the patient needs / wants help with: Completed SDOH with patient at bedside today, Patient has no needs at this time. Will follow in one week to check pt's future needs.    Follow up required: yes.  Follow-up Outreach - Due: 2/16/2025

## 2025-02-10 NOTE — CONSULTS
[facility] [place] Emergency Dept  Cardiology  Consult Note    Patient Name: Carolyn Piper  MRN: [id number]  Admission Date: 2/9/2025  Hospital Length of Stay: 0 days  Code Status: Full Code   Attending Provider: Ran Muro MD   Consulting Provider: Molly Torres MD  Primary Care Physician: Macy, Primary Doctor  Principal Problem:Unstable angina    Patient information was obtained from patient and ER records.     Inpatient consult to Cardiology  Consult performed by: Molly Torres MD  Consult ordered by: Clyde Yadav MD        Subjective:     Chief Complaint:    Chest pain    HPI:   Ms. Arndt a 69-year-old female with past medical history of hypertension, hyperlipidemia, peripheral arterial disease status post aortobifemoral bypass and right common femoral endarterectomy (2023) and COPD who is coming to the hospital for evaluation of chest pain.    Cardiology is consulted for further evaluation.      She was in her usual state of health until yesterday when after finishing mopping of the kitchen floor she started having chest discomfort.  It was on the left side of the chest and was associated with mild shortness of breath.  Because of persistent pain, she came to the ED for further evaluation.    EKG did not show any acute ST-T wave change.  Troponin has been negative.  She has been started on heparin drip.    This morning, when I saw her, she was hemodynamically stable.  She did not have any chest pain.    She follows up with vascular surgery at [facility].     She has extensive smoking history but now trying to cut down.  Currently she  smokes 2 cigarettes a day.        Past Medical History:   Diagnosis Date    COPD (chronic obstructive pulmonary disease)     H/O tubal ligation        Past Surgical History:   Procedure Laterality Date    ANKLE SURGERY      BREAST BIOPSY Left     30 yrs ago/ benign/ ex bx    BREAST BIOPSY Right     25 yrs ago/ benign/ ex bx    CHOLECYSTECTOMY      TUBAL LIGATION          Review of patient's allergies indicates:   Allergen Reactions    Fish containing products     Tetracyclines        No current facility-administered medications on file prior to encounter.     Current Outpatient Medications on File Prior to Encounter   Medication Sig    amLODIPine (NORVASC) 5 MG tablet Take 5 mg by mouth once daily.    aspirin (ECOTRIN) 81 MG EC tablet Take 81 mg by mouth once daily.    busPIRone (BUSPAR) 7.5 MG tablet Take 7.5 mg by mouth as needed.    COMBIVENT RESPIMAT  mcg/actuation inhaler Inhale 1 puff into the lungs 2 (two) times a day.    FLUoxetine 20 MG capsule Take 20 mg by mouth once daily.    furosemide (LASIX) 20 MG tablet Take 20 mg by mouth once daily.    gabapentin (NEURONTIN) 300 MG capsule Take 300 mg by mouth 2 (two) times daily as needed.    hydrOXYzine HCL (ATARAX) 25 MG tablet Take 25 mg by mouth 3 (three) times daily as needed for Itching or Anxiety.    LINZESS 145 mcg Cap capsule Take 145 mcg by mouth daily as needed.    metoclopramide HCl (REGLAN) 5 MG tablet Take 5 mg by mouth 3 (three) times daily.    metoprolol succinate (TOPROL-XL) 200 MG 24 hr tablet Take 200 mg by mouth every evening.    omeprazole (PRILOSEC) 40 MG capsule Take 40 mg by mouth.    rosuvastatin (CRESTOR) 10 MG tablet Take 10 mg by mouth every evening.    hydrOXYzine pamoate (VISTARIL) 50 MG Cap Take 1 capsule (50 mg total) by mouth 4 (four) times daily as needed. MAY CAUSE DROWSINESS (Patient not taking: Reported on 2/10/2025)    ibuprofen (ADVIL,MOTRIN) 200 MG tablet Take 200 mg by mouth every 6 (six) hours as needed. (Patient not taking: Reported on 2/10/2025)    lisinopril-hydrochlorothiazide (PRINZIDE,ZESTORETIC) 20-25 mg Tab Take 1 tablet by mouth once daily.    mirtazapine (REMERON) 15 MG tablet Take 15 mg by mouth every evening.    montelukast (SINGULAIR) 10 mg tablet Take 10 mg by mouth once daily. (Patient not taking: Reported on 2/10/2025)    ondansetron (ZOFRAN-ODT) 4 MG TbDL Take  1 tablet (4 mg total) by mouth every 12 (twelve) hours as needed (vomiting).    polyethylene glycol (GLYCOLAX) 17 gram/dose powder Take 17 g by mouth once daily.    traMADol (ULTRAM) 50 mg tablet Take 1 tablet (50 mg total) by mouth every 6 (six) hours as needed for Pain.    [DISCONTINUED] pantoprazole (PROTONIX) 40 MG tablet Take 1 tablet (40 mg total) by mouth once daily.     Family History    None       Tobacco Use    Smoking status: Every Day     Current packs/day: 0.50     Types: Cigarettes    Smokeless tobacco: Never   Substance and Sexual Activity    Alcohol use: No    Drug use: No    Sexual activity: Not on file     Review of Systems   Cardiovascular:  Positive for chest pain. Negative for claudication, dyspnea on exertion, irregular heartbeat, leg swelling, near-syncope, orthopnea, palpitations, paroxysmal nocturnal dyspnea and syncope.   Respiratory:  Negative for cough, shortness of breath, snoring and sputum production.    Gastrointestinal:  Negative for abdominal pain, dysphagia, heartburn, nausea and vomiting.   Neurological:  Negative for dizziness, headaches, loss of balance and weakness.     Objective:     Vital Signs (Most Recent):  Temp: 97.8 °F (36.6 °C) (02/10/25 0715)  Pulse: (!) 53 (02/10/25 1000)  Resp: 16 (02/10/25 1000)  BP: 120/60 (02/10/25 0715)  SpO2: (!) 92 % (02/10/25 1000) Vital Signs (24h Range):  Temp:  [97.4 °F (36.3 °C)-97.8 °F (36.6 °C)] 97.8 °F (36.6 °C)  Pulse:  [48-66] 53  Resp:  [14-47] 16  SpO2:  [91 %-98 %] 92 %  BP: (104-133)/(54-61) 120/60     Weight: 78.5 kg (173 lb)  Body mass index is 27.92 kg/m².    SpO2: (!) 92 %         Intake/Output Summary (Last 24 hours) at 2/10/2025 1045  Last data filed at 2/10/2025 0516  Gross per 24 hour   Intake 601.08 ml   Output --   Net 601.08 ml       Lines/Drains/Airways       Peripheral Intravenous Line  Duration                  Peripheral IV - Single Lumen 02/09/25 22 G Left Antecubital 1 day         Peripheral IV - Single Lumen  02/09/25 1712 20 G 1 in Anterior;Right Forearm <1 day                     Physical Exam  HENT:      Head: Normocephalic and atraumatic.      Mouth/Throat:      Mouth: Mucous membranes are moist.   Eyes:      Extraocular Movements: Extraocular movements intact.      Pupils: Pupils are equal, round, and reactive to light.   Cardiovascular:      Rate and Rhythm: Normal rate and regular rhythm.      Pulses: Normal pulses.      Heart sounds: Normal heart sounds.   Pulmonary:      Effort: Pulmonary effort is normal.      Breath sounds: Normal breath sounds.   Abdominal:      General: Bowel sounds are normal.      Palpations: Abdomen is soft.   Musculoskeletal:      Left lower leg: No edema.   Skin:     General: Skin is warm.   Neurological:      General: No focal deficit present.      Mental Status: She is alert.   Psychiatric:         Mood and Affect: Mood normal.         Behavior: Behavior normal.          Current Medications:   albuterol-ipratropium  3 mL Nebulization Q6H    amLODIPine  5 mg Oral Daily    aspirin  81 mg Oral Daily    atorvastatin  80 mg Oral Daily    busPIRone  7.5 mg Oral BID    clopidogreL  75 mg Oral Daily    FLUoxetine  20 mg Oral Daily    furosemide  20 mg Oral Daily    isosorbide mononitrate  30 mg Oral Daily    mirtazapine  15 mg Oral QHS    montelukast  10 mg Oral Daily    pantoprazole  40 mg Oral Daily         Current Facility-Administered Medications:     acetaminophen, 650 mg, Oral, Q6H PRN    aluminum-magnesium hydroxide-simethicone, 30 mL, Oral, Q6H PRN    benzonatate, 100 mg, Oral, TID PRN    gabapentin, 300 mg, Oral, BID PRN    guaiFENesin 100 mg/5 ml, 200 mg, Oral, Q4H PRN    hydrALAZINE, 10 mg, Intravenous, Q6H PRN    hydrOXYzine HCL, 25 mg, Oral, TID PRN    loperamide, 4 mg, Oral, Once PRN    melatonin, 6 mg, Oral, Nightly PRN    nitroGLYCERIN, 0.4 mg, Sublingual, Q5 Min PRN    ondansetron, 4 mg, Intravenous, Once PRN    senna-docusate 8.6-50 mg, 1 tablet, Oral, Daily PRN     simethicone, 1 tablet, Oral, TID PRN    traMADoL, 50 mg, Oral, Q6H PRN    Laboratory (all labs reviewed):  CBC:  Recent Labs   Lab 02/09/25  1711 02/10/25  0339   WBC 11.29 9.40   Hemoglobin 17.4 H 15.0   Hematocrit 49.8 H 44.9   Platelets 216 202       CHEMISTRIES:  Recent Labs   Lab 04/01/23  0412 04/02/23  0407 04/03/23  0422 02/09/25  1711 02/10/25  0339   Glucose  --   --   --  111 H 89   Sodium 136 137 139 134 L 139   Potassium 3.7 3.7 3.5 4.2 3.5   BUN 9.8 9.9 10.4 12 14   Creatinine 0.74 0.84 0.70 1.0 1.0   eGFR 89 L 76 L 95 >60 >60   Calcium 8.4 L 8.9 8.3 L 8.4 L 8.5 L   Magnesium  --   --   --  1.7 2.0       CARDIAC BIOMARKERS:  Recent Labs   Lab 02/09/25  1711 02/09/25  2110 02/10/25  0036 02/10/25  0339   Troponin I <0.006  <0.006 <0.006 <0.006 <0.006       COAGS:  Recent Labs   Lab 02/22/23  0909 02/09/25 2110   INR 0.9 1.0       LIPIDS/LFTS:  Recent Labs   Lab 02/27/23  1550 02/28/23  0504 03/29/23  2101 02/09/25  1711 02/09/25  2110 02/10/25  0339   Cholesterol  --   --   --   --  132  --    Triglycerides  --   --   --   --  89  --    HDL  --   --   --   --  52  --    LDL Cholesterol  --   --   --   --  62.2 L  --    Non-HDL Cholesterol  --   --   --   --  80  --    AST 29 23 32 33  --  26   ALT 8 L 7 L 25 33  --  25       BNP:  Recent Labs   Lab 02/09/25 1711    H       TSH:  Recent Labs   Lab 02/09/25 1711   TSH 1.986       Free T4:        Diagnostic Results:  ECG (personally reviewed and interpreted tracing(s)):    EKG done on 9th February 2025 showed sinus bradycardia without any ST-T wave change    Chest X-Ray (personally reviewed and interpreted image(s)):  no acute cardiopulmonary process    Echo: 6/2015    1 - Normal left ventricular systolic function (EF 55-60%).     2 - Concentric hypertrophy.     3 - Left ventricular diastolic dysfunction.     Stress Test: NA    Cath: NA   Assessment and Plan:     * Unstable angina   Patient coming in to the hospital with chest pain which started  after mopping the floor   EKG did not show any acute ST-T wave change and troponin negative   Currently hemodynamically stable    Most likely anginal pain   No ACS   We will medically manage with antianginal therapy   Start Imdur 30 mg daily   Due to bradycardia, decrease metoprolol to 100 mg daily   Continue amlodipine 5 mg daily   Continue with baby aspirin and rosuvastatin 10 mg daily   Echo pending.  If echo is unremarkable, can be discharged from cardiac perspective.   Follow with her cardiologist at [facility]    COPD (chronic obstructive pulmonary disease)   Medical management per primary team   Smoking cessation discussed    PAD (peripheral artery disease)   Continue with aspirin/ statin    Essential hypertension   Blood pressure stable    Continue home regimen        VTE Risk Mitigation (From admission, onward)           Ordered     IP VTE LOW RISK PATIENT  Once         02/09/25 1907     Place sequential compression device  Until discontinued         02/09/25 1907                    Molly Torres MD  Cardiology   [facility] [place] - Emergency Dept

## 2025-02-10 NOTE — ED PROVIDER NOTES
"Encounter Date: 2/9/2025       History     Chief Complaint   Patient presents with    Chest Pain     Pt BIB EMS, c/o a sudden onset of non radiating, midsternal CP x 30 minutes. Pt c/o SOB and n/v. Pt denies any diarrhea    Nausea     A 69-year-old female with past medical history of COPD, hypertension, PAD presents to the emergency department complaining of chest pain and shortness of breath.  Symptoms had sudden onset about 30 minutes prior to arrival in the emergency department. Chest pain is mainly exertional and radiating to the left arm and LUQ of abdomen.  Shortness of breath is also mainly exertional.  Patient does admit to intermittent chest pain and shortness of breath at rest.  Patient also complains of a dry cough that started this morning.  Patient states her symptoms feel different than his COPD exacerbation.  Shortness of breath worsens when lying down flat. Patient describes her chest pain as "on fire".  Patient also complains of leg swelling, but states she was started on furosemide by her primary care provider about 1 week ago.  Patient also takes 200 mg of metoprolol 20 mg of amlodipine daily.  Denies fever, nausea, vomiting, abdominal pain, syncope.         Review of patient's allergies indicates:   Allergen Reactions    Fish containing products     Tetracyclines      Past Medical History:   Diagnosis Date    COPD (chronic obstructive pulmonary disease)     H/O tubal ligation      Past Surgical History:   Procedure Laterality Date    ANKLE SURGERY      BREAST BIOPSY Left     30 yrs ago/ benign/ ex bx    BREAST BIOPSY Right     25 yrs ago/ benign/ ex bx    CHOLECYSTECTOMY      TUBAL LIGATION       No family history on file.  Social History     Tobacco Use    Smoking status: Every Day     Current packs/day: 0.50     Types: Cigarettes    Smokeless tobacco: Never   Substance Use Topics    Alcohol use: No    Drug use: No     Review of Systems   Constitutional:  Negative for chills and fever.   HENT:  " Positive for congestion. Negative for ear pain, nosebleeds, rhinorrhea, sore throat and trouble swallowing.    Eyes:  Negative for redness.   Respiratory:  Positive for cough and shortness of breath.    Cardiovascular:  Positive for chest pain and leg swelling.   Gastrointestinal:  Negative for abdominal pain, constipation, diarrhea, nausea and vomiting.   Genitourinary:  Negative for dysuria.   Musculoskeletal:  Negative for back pain and neck pain.   Skin:  Negative for rash.   Neurological:  Negative for dizziness, speech difficulty, weakness, light-headedness, numbness and headaches.   Psychiatric/Behavioral:  Negative for confusion.        Physical Exam     Initial Vitals [02/09/25 1547]   BP Pulse Resp Temp SpO2   122/60 60 20 -- 95 %      MAP       --         Physical Exam    Nursing note and vitals reviewed.  Constitutional: She appears well-developed and well-nourished.   HENT:   Head: Normocephalic and atraumatic.   Right Ear: External ear normal.   Left Ear: External ear normal.   Nose: Nose normal. Mouth/Throat: Oropharynx is clear and moist.   Eyes: Conjunctivae and lids are normal. Pupils are equal, round, and reactive to light.   Neck: Trachea normal. Neck supple.   Cardiovascular:  Regular rhythm, S1 normal, S2 normal, normal heart sounds and normal pulses.   Bradycardia present.   Exam reveals no gallop and no friction rub.       No murmur heard.  Pulmonary/Chest: Effort normal and breath sounds normal. No respiratory distress. She has no decreased breath sounds. She has no wheezes. She has no rhonchi. She has no rales.   Abdominal: Abdomen is soft. Bowel sounds are normal. She exhibits no distension. There is no abdominal tenderness. There is no rebound, no guarding, no tenderness at McBurney's point and negative Love's sign.   Musculoskeletal:         General: Normal range of motion.      Cervical back: Neck supple.      Right lower leg: No swelling. No edema.      Left lower leg: No swelling.  No edema.      Comments: Patient is able to move all extremities. 5/5 strength at upper and lower extremities.        Lymphadenopathy:     She has no cervical adenopathy.   Neurological: She is alert. She has normal strength. No sensory deficit.   Skin: Skin is warm and dry.   Psychiatric: She has a normal mood and affect.         ED Course   Procedures  Labs Reviewed   CBC W/ AUTO DIFFERENTIAL - Abnormal       Result Value    WBC 11.29      RBC 5.44 (*)     Hemoglobin 17.4 (*)     Hematocrit 49.8 (*)     MCV 92      MCH 32.0 (*)     MCHC 34.9      RDW 13.4      Platelets 216      MPV 9.3      Immature Granulocytes 0.4      Gran # (ANC) 7.0      Immature Grans (Abs) 0.05 (*)     Lymph # 3.3      Mono # 0.8      Eos # 0.1      Baso # 0.04      nRBC 0      Gran % 62.1      Lymph % 29.2      Mono % 6.9      Eosinophil % 1.0      Basophil % 0.4      Differential Method Automated     COMPREHENSIVE METABOLIC PANEL - Abnormal    Sodium 134 (*)     Potassium 4.2      Chloride 101      CO2 22 (*)     Glucose 111 (*)     BUN 12      Creatinine 1.0      Calcium 8.4 (*)     Total Protein 6.8      Albumin 3.3 (*)     Total Bilirubin 0.5      Alkaline Phosphatase 52      AST 33      ALT 33      eGFR >60      Anion Gap 11     B-TYPE NATRIURETIC PEPTIDE - Abnormal     (*)    LIPASE - Abnormal    Lipase 76 (*)    TROPONIN I    Troponin I <0.006     TROPONIN I    Troponin I <0.006     LIPASE   MAGNESIUM   MAGNESIUM    Magnesium 1.7     TSH   TSH    TSH 1.986     PROTIME-INR   APTT   LIPID PANEL   HEMOGLOBIN A1C   TROPONIN I   TROPONIN I   APTT   APTT   PROTIME-INR   TYPE & SCREEN     EKG Readings: (Independently Interpreted)   Initial Reading: No STEMI. Previous EKG: Compared with most recent EKG Rhythm: Sinus Bradycardia. Heart Rate: 54.       Imaging Results              X-Ray Chest PA And Lateral (Final result)  Result time 02/09/25 17:46:30      Final result by Stormy Ortez MD (02/09/25 17:46:30)                    Impression:      No acute cardiopulmonary process identified.      Electronically signed by: Stormy Ortez MD  Date:    02/09/2025  Time:    17:46               Narrative:    EXAMINATION:  XR CHEST PA AND LATERAL    CLINICAL HISTORY:  Chest Pain;    TECHNIQUE:  PA and lateral views of the chest were performed.    COMPARISON:  November 2020.    FINDINGS:  Cardiac silhouette is normal in size.  Lungs are symmetrically expanded.  No evidence of focal consolidative process, pneumothorax, or significant pleural effusion.  Soft tissue calcifications versus granulomas are seen over the left lower lung zone.  No acute osseous abnormality identified.                                       Medications   aspirin tablet 325 mg (325 mg Oral Not Given 2/9/25 1645)   morphine injection 2 mg (2 mg Intravenous Not Given 2/9/25 1700)   famotidine (PF) injection 20 mg (has no administration in time range)   sodium chloride 0.9% bolus 500 mL 500 mL (500 mLs Intravenous New Bag 2/9/25 1851)   aspirin chewable tablet 81 mg (has no administration in time range)   metoprolol tartrate (LOPRESSOR) tablet 25 mg (has no administration in time range)   atorvastatin tablet 80 mg (has no administration in time range)   nitroGLYCERIN SL tablet 0.4 mg (0.4 mg Sublingual Given 2/9/25 1922)   clopidogreL tablet 600 mg (has no administration in time range)   clopidogreL tablet 75 mg (has no administration in time range)   hydrALAZINE injection 10 mg (has no administration in time range)   guaiFENesin 100 mg/5 ml syrup 200 mg (has no administration in time range)   benzonatate capsule 100 mg (has no administration in time range)   aluminum-magnesium hydroxide-simethicone 200-200-20 mg/5 mL suspension 30 mL (has no administration in time range)   acetaminophen tablet 650 mg (has no administration in time range)   ondansetron injection 4 mg (has no administration in time range)   melatonin tablet 6 mg (has no administration in time range)   simethicone  chewable tablet 80 mg (has no administration in time range)   loperamide capsule 4 mg (has no administration in time range)   polyethylene glycol packet 17 g (has no administration in time range)   senna-docusate 8.6-50 mg per tablet 1 tablet (has no administration in time range)   heparin 25,000 units in dextrose 5% (100 units/ml) IV bolus from bag LOW INTENSITY nomogram - [facility] (has no administration in time range)   heparin 25,000 units in dextrose 5% 250 mL (100 units/mL) infusion LOW INTENSITY nomogram - [facility] (has no administration in time range)   heparin 25,000 units in dextrose 5% (100 units/ml) IV bolus from bag LOW INTENSITY nomogram - [facility] (has no administration in time range)   heparin 25,000 units in dextrose 5% (100 units/ml) IV bolus from bag LOW INTENSITY nomogram - [facility] (has no administration in time range)   ketorolac injection 15 mg (15 mg Intravenous Given 2/9/25 1745)   aluminum-magnesium hydroxide-simethicone 200-200-20 mg/5 mL suspension 30 mL (30 mLs Oral Given 2/9/25 1852)   calcium gluconate 1 g in NS IVPB (premixed) (0 g Intravenous Stopped 2/9/25 1901)     Medical Decision Making  Encounter Date: 2/9/2025    69 y.o. female presents for evaluation of chest pain and shortness of breath.  Hemodynamically stable. Afebrile. Phonating and protecting the airway spontaneously. No clinical evidence for cardiovascular instability or impending airway compromise.  Remainder of physical exam as above.  Prior medical records reviewed. PMD note reviewed. Current co-morbidities considered that will impact clinical decision making include as above.   Vitals range:   Pulse:  [50-60] 51  Resp:  [20-47] 47  SpO2:  [93 %-95 %] 95 %  BP: (122)/(60) 122/60    Differential diagnoses includes but is not limited to:   PE:  No tachycardia, no recent immovilization/surgery/travel/family history  Pneumonia: chest xray negative. No fever or leukoscytosis. Lungs non consistent with pna  Tamponade: unlikely due to chest xray  and ekg  STEMI: No STEMI on ekg  Dissection: equal pulses bilaterally and no ripping chest pain to the back  Esophageal rupture: no dysphagia or vomiting and chest xray negative for mediastinal air  Arrhythmia: no arrhythmia on ekg  CHF: no fluid overload on Cxr and physical exam  Pneumothorax: bilateral breath sounds and no signs of pneumothorax on chest xray   No evidence of hyperkalemia on CMP.  Lipase within normal limits.  I have low suspicion for pancreatitis  Negative Love's sign.  I have low suspicion for cholecystitis.    Patient currently takes 200 mg of metoprolol, 20 mg of amlodipine, and Lasix daily.  Patient states she has been on this regimen for quite awhile, but just recently started fluid pills.  Patient was given morphine in the emergency department with relief of chest pain.    Clinical picture today most consistent with ACS.   Doubt alternate pathology as listed in the differential above.    ED MEDS GIVEN:  Medications  aspirin tablet 325 mg (325 mg Oral Not Given 2/9/25 1645)  morphine injection 2 mg (2 mg Intravenous Not Given 2/9/25 1700)  famotidine (PF) injection 20 mg (has no administration in time range)  aluminum-magnesium hydroxide-simethicone 200-200-20 mg/5 mL suspension 30 mL (has no administration in time range)  calcium gluconate 1 g in NS IVPB (premixed) (has no administration in time range)  sodium chloride 0.9% bolus 500 mL 500 mL (has no administration in time range)  ketorolac injection 15 mg (15 mg Intravenous Given 2/9/25 1745)    Clinical Impression:  Final diagnoses:  [R07.9] Chest pain    After review of the patients physical exam, ED testing, and history/symptoms, the patient will be admitted.  Hospital medicine was paged. Call returned and the case was discussed.  Dr. Muro will accept the admission to the observation unit. The diagnosis, treatment and plan for admission were discussed with the patient and understanding was verbalized. All questions or concerns have  been addressed.     Stan Alvarado    DISCLAIMER: This note was prepared with [employer] voice recognition transcription software. Garbled syntax, mangled pronouns, and other bizarre constructions may be attributed to that software system.      Amount and/or Complexity of Data Reviewed  Labs: ordered. Decision-making details documented in ED Course.  Radiology: ordered.    Risk  OTC drugs.  Prescription drug management.               ED Course as of 02/09/25 1923   Sun Feb 09, 2025   1646 EKG with no high-degree heart block, sinus bradycardia, rate of 54, no STEMI, no variable WI interval on visible beats, possibly hyperacute T-waves, pending repeat, troponin [LF]   1754 Lipase(!): 76 [LF]   1837 Given patient's bradycardia of 50, associated with chest pain, with patient reported to be taking 200 mg metoprolol q.h.s., 20 mg of amlodipine once a day, along with Lasix, concern for symptomatic bradycardia due to over use of beta-blocker and calcium channel blocker, 1 is prescribed by her cardiologist on the other progressive scribed by her primary care doctor, with no up-to-date list in the system, we will give 1 g of calcium to see if bradycardia improves [LF]      ED Course User Index  [LF] Kiana Lkae MD                           Clinical Impression:  Final diagnoses:  [R07.9] Chest pain          ED Disposition Condition    Observation                 tSan Alvarado, WILLY  02/09/25 1924

## 2025-02-10 NOTE — NURSING
Recollect of ptt still greater than 150. Heparin still on hold. New ptt to be collected in 1 hour per nomogram.

## 2025-02-10 NOTE — HOSPITAL COURSE
Carolyn Piper 69 y.o. female placed in observation for chest pain. In the ED, afebrile without leukocytosis, EKG without ST elevation, sinus bradycardia on telemetry, chest x-ray without acute process. She was seen by cardiology and underwent an echo without evidence of wall motion, but hyperdynamic EF. No further inpatient cardiac testing was planned. Her medications were adjusted and metoprolol dose decreased given bradycardia, amlodipine D/C and started on imdur. She reports she can see her [facility] Cardiologist 2/11. At discharge she was on RA and chest pain free.    appt 4/7/23 0759

## 2025-02-10 NOTE — PLAN OF CARE
Case Management Final Discharge Note      Discharge Disposition: home     New DME ordered / company name: none    Relevant SDOH / Transition of Care Barriers:  none    Person available to provide assistance at home when needed and their contact information: Stephan Mills (Spouse)  [phone] (Home Phone)     Scheduled followup appointment: PCP 2/11/25 @2:30pm added to AVS    Referrals placed: none    Transportation: family    Patient and family educated on discharge services and updated on DC plan. Bedside RN notified, patient clear to discharge from Case Management Perspective.      02/10/25 1333   Final Note   Assessment Type Final Discharge Note   Anticipated Discharge Disposition Home   What phone number can be called within the next 1-3 days to see how you are doing after discharge? [phone]   Hospital Resources/Appts/Education Provided Appointments scheduled and added to AVS   Post-Acute Status   Discharge Delays None known at this time

## 2025-02-10 NOTE — PLAN OF CARE
Discharge instructions given to patient.  Patient verbalized understanding and had no further questions.  Patient's IVs, tele removed and vital signs wnl.  Patient now awaiting ride from family.  Will continue to monitor.       Problem: Adult Inpatient Plan of Care  Goal: Plan of Care Review  Outcome: Met  Goal: Patient-Specific Goal (Individualized)  Outcome: Met  Goal: Absence of Hospital-Acquired Illness or Injury  Outcome: Met  Goal: Optimal Comfort and Wellbeing  Outcome: Met  Goal: Readiness for Transition of Care  Outcome: Met     Problem: Acute Coronary Syndrome  Goal: Optimal Adaptation to Illness  Outcome: Met  Goal: Absence of Cardiac-Related Pain  Outcome: Met  Goal: Normalized Cardiac Rhythm  Outcome: Met  Goal: Effective Cardiac Pump Function  Outcome: Met  Goal: Adequate Tissue Perfusion  Outcome: Met     Problem: Cardiac Catheterization (Diagnostic/Interventional)  Goal: Absence of Bleeding  Outcome: Met  Goal: Absence of Contrast-Induced Injury  Outcome: Met  Goal: Stable Heart Rate and Rhythm  Outcome: Met  Goal: Absence of Embolism Signs and Symptoms  Outcome: Met  Goal: Anesthesia/Sedation Recovery  Outcome: Met  Goal: Optimal Pain Control and Function  Outcome: Met  Goal: Absence of Vascular Access Complication  Outcome: Met     Problem: Infection  Goal: Absence of Infection Signs and Symptoms  Outcome: Met

## 2025-02-10 NOTE — H&P
[facility] Emergency Dept  [facility] Medicine  History & Physical    Patient Name: Carolyn Piper  MRN: [id number]  Patient Class: OP- Observation  Admission Date: 2/9/2025  Attending Physician: Ran Muro MD   Primary Care Provider: [facility]         Patient information was obtained from patient, past medical records, and ER records.     Subjective:     Principal Problem:Unstable angina    Chief Complaint:   Chief Complaint   Patient presents with    Chest Pain     Pt BIB EMS, c/o a sudden onset of non radiating, midsternal CP x 30 minutes. Pt c/o SOB and n/v. Pt denies any diarrhea    Nausea        HPI: Carolyn Piper is a 69 y.o. female with  PAD s/p aorto-femoral bypass, HTN, HLD, ESRD on HD ( ), and Anxiety who presented to [facility] ED on 02/09/2025 for eval and treatment of chest pain.  They describe their pain as pressure, 6/10, located beneath the sternum with radiation to her L shoulder and arm.  It started 30 minutes prior to presentation and has been intermittent since.  There is associated dyspnea, nausea, and diaphoresis.  They deny associated fever, chills, known recent illness.  Symptoms are alleviated by NTG.   No notable recent healthcare encounters.    ED course notable for the following: Trop and BNP negative.  EKG without ST changes.  Imaging: CXR clear.  ED therapy/stabilization measures: DAPT load, heparin gtt, NTG.  They were placed in observation under the care of [facility] for further evaluation and treatment.        Past Medical History:   Diagnosis Date    COPD (chronic obstructive pulmonary disease)     H/O tubal ligation        Past Surgical History:   Procedure Laterality Date    ANKLE SURGERY      BREAST BIOPSY Left     30 yrs ago/ benign/ ex bx    BREAST BIOPSY Right     25 yrs ago/ benign/ ex bx    CHOLECYSTECTOMY      TUBAL LIGATION         Review of patient's allergies indicates:   Allergen Reactions    Fish containing products      Tetracyclines        No current facility-administered medications on file prior to encounter.     Current Outpatient Medications on File Prior to Encounter   Medication Sig    amLODIPine (NORVASC) 5 MG tablet Take 5 mg by mouth once daily.    busPIRone (BUSPAR) 7.5 MG tablet Take 7.5 mg by mouth 2 (two) times daily.    COMBIVENT RESPIMAT  mcg/actuation inhaler Inhale 1 puff into the lungs 2 (two) times a day.    FLUoxetine 20 MG capsule Take 20 mg by mouth once daily.    furosemide (LASIX) 20 MG tablet Take 20 mg by mouth once daily.    hydrOXYzine HCL (ATARAX) 25 MG tablet Take 25 mg by mouth 3 (three) times daily as needed for Itching or Anxiety.    LINZESS 145 mcg Cap capsule Take 145 mcg by mouth daily as needed.    metoclopramide HCl (REGLAN) 5 MG tablet Take 5 mg by mouth 3 (three) times daily.    rosuvastatin (CRESTOR) 10 MG tablet Take 10 mg by mouth every evening.    aspirin (ECOTRIN) 81 MG EC tablet Take 81 mg by mouth once daily.    gabapentin (NEURONTIN) 300 MG capsule Take 300 mg by mouth 2 (two) times daily as needed.    hydrOXYzine pamoate (VISTARIL) 50 MG Cap Take 1 capsule (50 mg total) by mouth 4 (four) times daily as needed. MAY CAUSE DROWSINESS    ibuprofen (ADVIL,MOTRIN) 200 MG tablet Take 200 mg by mouth every 6 (six) hours as needed.    lisinopril-hydrochlorothiazide (PRINZIDE,ZESTORETIC) 20-25 mg Tab Take 1 tablet by mouth once daily.    metoprolol succinate (TOPROL-XL) 200 MG 24 hr tablet Take 200 mg by mouth every evening.    mirtazapine (REMERON) 15 MG tablet Take 15 mg by mouth every evening.    montelukast (SINGULAIR) 10 mg tablet Take 10 mg by mouth once daily.    ondansetron (ZOFRAN-ODT) 4 MG TbDL Take 1 tablet (4 mg total) by mouth every 12 (twelve) hours as needed (vomiting).    pantoprazole (PROTONIX) 40 MG tablet Take 1 tablet (40 mg total) by mouth once daily.    polyethylene glycol (GLYCOLAX) 17 gram/dose powder Take 17 g by mouth once daily.    traMADol (ULTRAM) 50 mg  tablet Take 1 tablet (50 mg total) by mouth every 6 (six) hours as needed for Pain.     Family History    None       Tobacco Use    Smoking status: Every Day     Current packs/day: 0.50     Types: Cigarettes    Smokeless tobacco: Never   Substance and Sexual Activity    Alcohol use: No    Drug use: No    Sexual activity: Not on file     Review of Systems   Reason unable to perform ROS: ROS was performed and pertinent +s and -s are listed in HPI.     Objective:     Vital Signs (Most Recent):  Pulse: (!) 52 (02/10/25 0100)  Resp: (!) 26 (02/10/25 0100)  BP: (!) 125/59 (02/10/25 0100)  SpO2: (!) 91 % (02/10/25 0100) Vital Signs (24h Range):  Pulse:  [48-60] 52  Resp:  [17-47] 26  SpO2:  [90 %-95 %] 91 %  BP: (104-125)/(54-60) 125/59     Weight: 78.5 kg (173 lb)  Body mass index is 27.92 kg/m².     Physical Exam  Constitutional:       General: She is not in acute distress.     Appearance: She is underweight. She is not ill-appearing or toxic-appearing.   HENT:      Head: Normocephalic.   Cardiovascular:      Rate and Rhythm: Normal rate and regular rhythm.      Pulses: Normal pulses.      Heart sounds: Normal heart sounds.   Pulmonary:      Effort: Pulmonary effort is normal.      Breath sounds: No rales.   Abdominal:      General: Abdomen is flat. Bowel sounds are normal.      Tenderness: There is no abdominal tenderness. There is no guarding.   Musculoskeletal:      Right lower leg: No edema.      Left lower leg: No edema.   Skin:     General: Skin is warm and dry.      Capillary Refill: Capillary refill takes less than 2 seconds.      Coloration: Skin is not jaundiced.   Neurological:      General: No focal deficit present.      Mental Status: She is alert and oriented to person, place, and time.   Psychiatric:         Mood and Affect: Mood normal.         Behavior: Behavior normal.                Significant Labs: All pertinent labs within the past 24 hours have been reviewed.  CBC:   Recent Labs   Lab  02/09/25  1711   WBC 11.29   HGB 17.4*   HCT 49.8*        CMP:   Recent Labs   Lab 02/09/25  1711   *   K 4.2      CO2 22*   *   BUN 12   CREATININE 1.0   CALCIUM 8.4*   PROT 6.8   ALBUMIN 3.3*   BILITOT 0.5   ALKPHOS 52   AST 33   ALT 33   ANIONGAP 11     Cardiac Markers:   Recent Labs   Lab 02/09/25  1711   *     Troponin:   Recent Labs   Lab 02/09/25  1711 02/09/25  2110   TROPONINI <0.006  <0.006 <0.006       Significant Imaging: I have reviewed all pertinent imaging results/findings within the past 24 hours.      Assessment/Plan:     * Unstable angina  H/O aorto-femoral bypass  HLD  HTN    Patient describes chest pain as constant substernal pressure that is relieved by NTG  Their known cardiac risk factors include female older than 55 years of age, hyperlipidemia, hypertension, obesity, and sedentary lifestyle.  EKG (see above) without ST changes c/f ischemia  Most recent Troponins (starting w latest at top):  Lab Results   Component Value Date    TROPONINI <0.006 02/09/2025    TROPONINI <0.006 02/09/2025    TROPONINI <0.006 02/09/2025    TROPONINI 0.010 11/05/2020    TROPONINI <0.006 10/29/2020      Lab Results   Component Value Date    CREATININE 1.0 02/09/2025       CHRISTELLE 4      We are concerned their chest pain may be ischemic in nature per the above data.  Admitting them to the hospital for further eval and treatment:    Cardiology consulted  Pt on UA/NSTEMI pathway  Start ACS protocol with  mg load followed by 81 mg qd, P2Y12 inhibitor load followed by maintenance dosage, and heparin gtt  High intensity Statin: atorvastatin 80  start B-blocker as tolerated; hold if pt develops symptoms concerning for active heart failure  start ACEi/ARB as tolerated  TTE pending  Trend troponin (to peak or flat) and EKGs q6hr, or sooner if pt's sxs worsen  Lipid panel, TSH, A1c  NTG PRN for chest pain  Low sodium diet, NPO at MN      COPD (chronic obstructive pulmonary  disease)  Patient's COPD is controlled currently.  Patient is currently off COPD Pathway. Continue scheduled inhalers and monitor respiratory status closely.     ESRD (end stage renal disease) on dialysis  Creatine stable for now. BMP reviewed- noted Estimated Creatinine Clearance: 56.2 mL/min (based on SCr of 1 mg/dL). according to latest data. Based on current GFR, CKD stage is end stage.  Monitor UOP and serial BMP and adjust therapy as needed. Renally dose meds. Avoid nephrotoxic medications and procedures.  Consulting Nephrology.    Essential hypertension  Patient's blood pressure range in the last 24 hours was: BP  Min: 104/54  Max: 125/59.The patient's inpatient anti-hypertensive regimen is listed below:  Current Antihypertensives  nitroGLYCERIN SL tablet 0.4 mg, Every 5 min PRN, Sublingual  hydrALAZINE injection 10 mg, Every 6 hours PRN, Intravenous  , Daily, Oral  , Daily, Oral  , Nightly, Oral  amLODIPine tablet 5 mg, Daily, Oral  furosemide tablet 20 mg, Daily, Oral  metoprolol tartrate (LOPRESSOR) tablet 50 mg, 4 times daily, Oral    Plan  - BP is controlled, no changes needed to their regimen        VTE Risk Mitigation (From admission, onward)           Ordered     heparin 25,000 units in dextrose 5% (100 units/ml) IV bolus from bag LOW INTENSITY nomogram - [facility]  As needed (PRN)        Question:  Heparin Infusion Adjustment (DO NOT MODIFY ANSWER)  Answer:  [email or web address]\[employer]\Images\Pharmacy\HeparinInfusions\heparin LOW INTENSITY nomogram for [facility] [email or web address]    02/09/25 1913     heparin 25,000 units in dextrose 5% (100 units/ml) IV bolus from bag LOW INTENSITY nomogram - [facility]  As needed (PRN)        Question:  Heparin Infusion Adjustment (DO NOT MODIFY ANSWER)  Answer:  [email or web address]\[employer]\Images\Pharmacy\HeparinInfusions\heparin LOW INTENSITY nomogram for [facility] [id number]    02/09/25 1913     heparin 25,000 units in dextrose 5% 250 mL (100 units/mL) infusion LOW INTENSITY nomogram - [facility]  Continuous         Question:  Begin at (units/kg/hr)  Answer:  12    02/09/25 1913     IP VTE LOW RISK PATIENT  Once         02/09/25 1907     Place sequential compression device  Until discontinued         02/09/25 1907                       On 02/10/2025, patient should be placed in hospital observation services under my care.             Clyde Yadav MD  Department of Hospital Medicine  [facility] [place]

## 2025-02-10 NOTE — ASSESSMENT & PLAN NOTE
Creatine stable for now. BMP reviewed- noted Estimated Creatinine Clearance: 56.2 mL/min (based on SCr of 1 mg/dL). according to latest data. Based on current GFR, CKD stage is end stage.  Monitor UOP and serial BMP and adjust therapy as needed. Renally dose meds. Avoid nephrotoxic medications and procedures.  Consulting Nephrology.

## 2025-02-10 NOTE — ED NOTES
Patient says she only feels pressure in her chest and feels no pain at all right now.  Provider notified.

## 2025-02-12 LAB
OHS QRS DURATION: 80 MS
OHS QRS DURATION: 82 MS
OHS QTC CALCULATION: 451 MS
OHS QTC CALCULATION: 475 MS

## 2025-03-14 ENCOUNTER — PATIENT OUTREACH (OUTPATIENT)
Dept: ADMINISTRATIVE | Facility: OTHER | Age: 70
End: 2025-03-14
Payer: MEDICARE